# Patient Record
Sex: FEMALE | Race: WHITE | NOT HISPANIC OR LATINO | Employment: PART TIME | URBAN - METROPOLITAN AREA
[De-identification: names, ages, dates, MRNs, and addresses within clinical notes are randomized per-mention and may not be internally consistent; named-entity substitution may affect disease eponyms.]

---

## 2019-10-29 ENCOUNTER — APPOINTMENT (EMERGENCY)
Dept: CT IMAGING | Facility: HOSPITAL | Age: 64
DRG: 816 | End: 2019-10-29
Payer: COMMERCIAL

## 2019-10-29 ENCOUNTER — HOSPITAL ENCOUNTER (INPATIENT)
Facility: HOSPITAL | Age: 64
LOS: 4 days | Discharge: HOME | DRG: 816 | End: 2019-11-02
Attending: EMERGENCY MEDICINE | Admitting: INTERNAL MEDICINE
Payer: COMMERCIAL

## 2019-10-29 DIAGNOSIS — T51.2X1A ISOPROPYL ALCOHOL POISONING: ICD-10-CM

## 2019-10-29 DIAGNOSIS — G93.40 ACUTE ENCEPHALOPATHY: Primary | ICD-10-CM

## 2019-10-29 DIAGNOSIS — R65.10 SIRS (SYSTEMIC INFLAMMATORY RESPONSE SYNDROME) (HCC): ICD-10-CM

## 2019-10-29 DIAGNOSIS — N17.9 AKI (ACUTE KIDNEY INJURY) (HCC): ICD-10-CM

## 2019-10-29 DIAGNOSIS — F32.A DEPRESSION: ICD-10-CM

## 2019-10-29 DIAGNOSIS — K29.21 ACUTE ALCOHOLIC GASTRITIS WITH HEMORRHAGE: ICD-10-CM

## 2019-10-29 PROBLEM — G92.8 TOXIC METABOLIC ENCEPHALOPATHY: Status: ACTIVE | Noted: 2019-10-29

## 2019-10-29 PROBLEM — F10.20 CHRONIC ALCOHOLISM (HCC): Status: ACTIVE | Noted: 2019-10-29

## 2019-10-29 PROBLEM — E87.6 HYPOKALEMIA: Status: ACTIVE | Noted: 2019-10-29

## 2019-10-29 PROBLEM — D72.829 LEUKOCYTOSIS: Status: ACTIVE | Noted: 2019-10-29

## 2019-10-29 LAB
ABO GROUP BLD: NORMAL
ALBUMIN SERPL BCP-MCNC: 3.6 G/DL (ref 3.5–5)
ALP SERPL-CCNC: 87 U/L (ref 46–116)
ALT SERPL W P-5'-P-CCNC: 27 U/L (ref 12–78)
AMMONIA PLAS-SCNC: <10 UMOL/L (ref 11–35)
AMPHETAMINES SERPL QL SCN: NEGATIVE
ANION GAP SERPL CALCULATED.3IONS-SCNC: 11 MMOL/L (ref 4–13)
APAP SERPL-MCNC: <2 UG/ML (ref 10–20)
APTT PPP: 26 SECONDS (ref 23–37)
ARTERIAL PATENCY WRIST A: YES
AST SERPL W P-5'-P-CCNC: 45 U/L (ref 5–45)
BACTERIA UR QL AUTO: ABNORMAL /HPF
BARBITURATES UR QL: NEGATIVE
BASE EX.OXY STD BLDV CALC-SCNC: 95.9 % (ref 60–80)
BASE EXCESS BLDA CALC-SCNC: 4.1 MMOL/L
BASE EXCESS BLDV CALC-SCNC: 1.5 MMOL/L
BASOPHILS # BLD AUTO: 0.03 THOUSANDS/ΜL (ref 0–0.1)
BASOPHILS NFR BLD AUTO: 0 % (ref 0–1)
BENZODIAZ UR QL: NEGATIVE
BILIRUB SERPL-MCNC: 0.35 MG/DL (ref 0.2–1)
BILIRUB UR QL STRIP: NEGATIVE
BLD GP AB SCN SERPL QL: NEGATIVE
BUN SERPL-MCNC: 36 MG/DL (ref 5–25)
CALCIUM SERPL-MCNC: 8.3 MG/DL (ref 8.3–10.1)
CHLORIDE SERPL-SCNC: 94 MMOL/L (ref 100–108)
CLARITY UR: CLEAR
CO2 SERPL-SCNC: 28 MMOL/L (ref 21–32)
COCAINE UR QL: NEGATIVE
COLOR UR: YELLOW
CREAT SERPL-MCNC: 2.54 MG/DL (ref 0.6–1.3)
EOSINOPHIL # BLD AUTO: 0.01 THOUSAND/ΜL (ref 0–0.61)
EOSINOPHIL NFR BLD AUTO: 0 % (ref 0–6)
ERYTHROCYTE [DISTWIDTH] IN BLOOD BY AUTOMATED COUNT: 13.4 % (ref 11.6–15.1)
ETHANOL SERPL-MCNC: <3 MG/DL (ref 0–3)
GFR SERPL CREATININE-BSD FRML MDRD: 20 ML/MIN/1.73SQ M
GLUCOSE SERPL-MCNC: 177 MG/DL (ref 65–140)
GLUCOSE UR STRIP-MCNC: NEGATIVE MG/DL
HCO3 BLDA-SCNC: 24.4 MMOL/L (ref 22–28)
HCO3 BLDV-SCNC: 20.9 MMOL/L (ref 24–30)
HCT VFR BLD AUTO: 37.8 % (ref 34.8–46.1)
HGB BLD-MCNC: 12.8 G/DL (ref 11.5–15.4)
HGB UR QL STRIP.AUTO: ABNORMAL
IMM GRANULOCYTES # BLD AUTO: 0.21 THOUSAND/UL (ref 0–0.2)
IMM GRANULOCYTES NFR BLD AUTO: 1 % (ref 0–2)
INR PPP: 1.08 (ref 0.84–1.19)
KETONES UR STRIP-MCNC: ABNORMAL MG/DL
LACTATE SERPL-SCNC: 2.7 MMOL/L (ref 0.5–2)
LEUKOCYTE ESTERASE UR QL STRIP: NEGATIVE
LYMPHOCYTES # BLD AUTO: 2.18 THOUSANDS/ΜL (ref 0.6–4.47)
LYMPHOCYTES NFR BLD AUTO: 10 % (ref 14–44)
MCH RBC QN AUTO: 30.4 PG (ref 26.8–34.3)
MCHC RBC AUTO-ENTMCNC: 33.9 G/DL (ref 31.4–37.4)
MCV RBC AUTO: 90 FL (ref 82–98)
METHADONE UR QL: NEGATIVE
MONOCYTES # BLD AUTO: 1.17 THOUSAND/ΜL (ref 0.17–1.22)
MONOCYTES NFR BLD AUTO: 6 % (ref 4–12)
NEUTROPHILS # BLD AUTO: 17.47 THOUSANDS/ΜL (ref 1.85–7.62)
NEUTS SEG NFR BLD AUTO: 83 % (ref 43–75)
NITRITE UR QL STRIP: NEGATIVE
NON VENT ROOM AIR: 21 %
NON-SQ EPI CELLS URNS QL MICRO: ABNORMAL /HPF
NRBC BLD AUTO-RTO: 0 /100 WBCS
O2 CT BLDA-SCNC: 16.8 ML/DL (ref 16–23)
O2 CT BLDV-SCNC: 18.5 ML/DL
OPIATES UR QL SCN: NEGATIVE
OXYHGB MFR BLDA: 94.1 % (ref 94–97)
PCO2 BLDA: 25 MM HG (ref 36–44)
PCO2 BLDV: 21 MM HG (ref 42–50)
PCP UR QL: NEGATIVE
PH BLDA: 7.61 [PH] (ref 7.35–7.45)
PH BLDV: 7.62 [PH] (ref 7.3–7.4)
PH UR STRIP.AUTO: 7 [PH] (ref 4.5–8)
PLATELET # BLD AUTO: 315 THOUSANDS/UL (ref 149–390)
PMV BLD AUTO: 10.2 FL (ref 8.9–12.7)
PO2 BLDA: 66.3 MM HG (ref 75–129)
PO2 BLDV: 108.2 MM HG (ref 35–45)
POTASSIUM SERPL-SCNC: 3.2 MMOL/L (ref 3.5–5.3)
PROT SERPL-MCNC: 6.7 G/DL (ref 6.4–8.2)
PROT UR STRIP-MCNC: ABNORMAL MG/DL
PROTHROMBIN TIME: 14.1 SECONDS (ref 11.6–14.5)
RBC # BLD AUTO: 4.21 MILLION/UL (ref 3.81–5.12)
RBC #/AREA URNS AUTO: ABNORMAL /HPF
RH BLD: NEGATIVE
SALICYLATES SERPL-MCNC: <3 MG/DL (ref 3–20)
SODIUM SERPL-SCNC: 133 MMOL/L (ref 136–145)
SP GR UR STRIP.AUTO: 1.01 (ref 1–1.03)
SPECIMEN EXPIRATION DATE: NORMAL
SPECIMEN SOURCE: ABNORMAL
THC UR QL: NEGATIVE
TSH SERPL DL<=0.05 MIU/L-ACNC: 0.39 UIU/ML (ref 0.36–3.74)
UROBILINOGEN UR QL STRIP.AUTO: 0.2 E.U./DL
WBC # BLD AUTO: 21.07 THOUSAND/UL (ref 4.31–10.16)
WBC #/AREA URNS AUTO: ABNORMAL /HPF

## 2019-10-29 PROCEDURE — 86901 BLOOD TYPING SEROLOGIC RH(D): CPT | Performed by: EMERGENCY MEDICINE

## 2019-10-29 PROCEDURE — 85025 COMPLETE CBC W/AUTO DIFF WBC: CPT | Performed by: EMERGENCY MEDICINE

## 2019-10-29 PROCEDURE — 83605 ASSAY OF LACTIC ACID: CPT | Performed by: EMERGENCY MEDICINE

## 2019-10-29 PROCEDURE — 71250 CT THORAX DX C-: CPT

## 2019-10-29 PROCEDURE — 84443 ASSAY THYROID STIM HORMONE: CPT | Performed by: EMERGENCY MEDICINE

## 2019-10-29 PROCEDURE — 82805 BLOOD GASES W/O2 SATURATION: CPT | Performed by: NURSE PRACTITIONER

## 2019-10-29 PROCEDURE — 99285 EMERGENCY DEPT VISIT HI MDM: CPT

## 2019-10-29 PROCEDURE — 99285 EMERGENCY DEPT VISIT HI MDM: CPT | Performed by: EMERGENCY MEDICINE

## 2019-10-29 PROCEDURE — 85730 THROMBOPLASTIN TIME PARTIAL: CPT | Performed by: EMERGENCY MEDICINE

## 2019-10-29 PROCEDURE — 80307 DRUG TEST PRSMV CHEM ANLYZR: CPT | Performed by: EMERGENCY MEDICINE

## 2019-10-29 PROCEDURE — 93005 ELECTROCARDIOGRAM TRACING: CPT

## 2019-10-29 PROCEDURE — 74176 CT ABD & PELVIS W/O CONTRAST: CPT

## 2019-10-29 PROCEDURE — 82805 BLOOD GASES W/O2 SATURATION: CPT | Performed by: EMERGENCY MEDICINE

## 2019-10-29 PROCEDURE — C9113 INJ PANTOPRAZOLE SODIUM, VIA: HCPCS | Performed by: EMERGENCY MEDICINE

## 2019-10-29 PROCEDURE — 96361 HYDRATE IV INFUSION ADD-ON: CPT

## 2019-10-29 PROCEDURE — 36415 COLL VENOUS BLD VENIPUNCTURE: CPT | Performed by: EMERGENCY MEDICINE

## 2019-10-29 PROCEDURE — 81001 URINALYSIS AUTO W/SCOPE: CPT

## 2019-10-29 PROCEDURE — 96365 THER/PROPH/DIAG IV INF INIT: CPT

## 2019-10-29 PROCEDURE — 86900 BLOOD TYPING SEROLOGIC ABO: CPT | Performed by: EMERGENCY MEDICINE

## 2019-10-29 PROCEDURE — 82140 ASSAY OF AMMONIA: CPT | Performed by: EMERGENCY MEDICINE

## 2019-10-29 PROCEDURE — 86850 RBC ANTIBODY SCREEN: CPT | Performed by: EMERGENCY MEDICINE

## 2019-10-29 PROCEDURE — 70450 CT HEAD/BRAIN W/O DYE: CPT

## 2019-10-29 PROCEDURE — 80053 COMPREHEN METABOLIC PANEL: CPT | Performed by: EMERGENCY MEDICINE

## 2019-10-29 PROCEDURE — 96367 TX/PROPH/DG ADDL SEQ IV INF: CPT

## 2019-10-29 PROCEDURE — 80320 DRUG SCREEN QUANTALCOHOLS: CPT | Performed by: EMERGENCY MEDICINE

## 2019-10-29 PROCEDURE — 85610 PROTHROMBIN TIME: CPT | Performed by: EMERGENCY MEDICINE

## 2019-10-29 PROCEDURE — 80329 ANALGESICS NON-OPIOID 1 OR 2: CPT | Performed by: EMERGENCY MEDICINE

## 2019-10-29 RX ORDER — DOXYCYCLINE HYCLATE 100 MG/1
100 CAPSULE ORAL EVERY 12 HOURS SCHEDULED
COMMUNITY
End: 2019-11-02 | Stop reason: HOSPADM

## 2019-10-29 RX ORDER — LORAZEPAM 2 MG/ML
1 INJECTION INTRAMUSCULAR EVERY 4 HOURS PRN
Status: DISCONTINUED | OUTPATIENT
Start: 2019-10-29 | End: 2019-10-31

## 2019-10-29 RX ORDER — SODIUM CHLORIDE, SODIUM LACTATE, POTASSIUM CHLORIDE, CALCIUM CHLORIDE 600; 310; 30; 20 MG/100ML; MG/100ML; MG/100ML; MG/100ML
125 INJECTION, SOLUTION INTRAVENOUS CONTINUOUS
Status: DISCONTINUED | OUTPATIENT
Start: 2019-10-29 | End: 2019-10-30

## 2019-10-29 RX ADMIN — SODIUM CHLORIDE, SODIUM LACTATE, POTASSIUM CHLORIDE, AND CALCIUM CHLORIDE 125 ML/HR: .6; .31; .03; .02 INJECTION, SOLUTION INTRAVENOUS at 21:37

## 2019-10-29 RX ADMIN — SODIUM CHLORIDE 80 MG: 9 INJECTION, SOLUTION INTRAVENOUS at 20:03

## 2019-10-29 RX ADMIN — SODIUM CHLORIDE 1000 ML: 0.9 INJECTION, SOLUTION INTRAVENOUS at 19:25

## 2019-10-29 RX ADMIN — SODIUM CHLORIDE, SODIUM LACTATE, POTASSIUM CHLORIDE, AND CALCIUM CHLORIDE 1000 ML: .6; .31; .03; .02 INJECTION, SOLUTION INTRAVENOUS at 21:36

## 2019-10-29 NOTE — ED NOTES
RN called pharmacy at this time, Protonix is being mixed at this time        Vicki Murphy RN  10/29/19 1945

## 2019-10-29 NOTE — ED ATTENDING ATTESTATION
10/29/2019  IMary DO, saw and evaluated the patient  I have discussed the patient with the resident/non-physician practitioner and agree with the resident's/non-physician practitioner's findings, Plan of Care, and MDM as documented in the resident's/non-physician practitioner's note, except where noted  All available labs and Radiology studies were reviewed  I was present for key portions of any procedure(s) performed by the resident/non-physician practitioner and I was immediately available to provide assistance  At this point I agree with the current assessment done in the Emergency Department  I have conducted an independent evaluation of this patient a history and physical is as follows:    72-year-old female presents via EMS for evaluation of altered mental status with bloody vomitus  The patient is nonverbal and history is provided by EMS  The patient was unable to provide any history or review of systems  The patient apparently did not report to work for the past week and coworkers went up to check on the patient  They found her home with an altered mental status and vomitus all over her residence  There coffee cancer filled with dark brown vomitus  The patient was covered in bloody and dark vomitus  The patient's eyes are open however she does not respond to questions or make any significant purposeful movements  On initial examination:  The patient is awake with her eyes open but not responding to verbal questioning or commands  Pupils are equal round right reactive to light bilaterally    The sclera nonicteric  The head is normocephalic and atraumatic  Mucous membranes are dry with vomitus noted  The external ears are normal bilaterally  The neck is supple without JVD or tracheal deviation  The lungs are clear to auscultation bilaterally without increased work of breathing X line the heart is regular and tachycardic there is no murmur appreciated  The abdomen is soft, protuberant, nontender nondistended with positive bowel sounds  There is no rebound or guarding  The external genitalia are grossly normal  Skin is warm dry with scratches noted  There is no specific rash  Patient is awake, nonverbal, the eyes open spontaneously, patient withdraws to painful stimuli and has purposeful movement    Plan is to obtain a CT of the head abdomen pelvis for occult trauma, infection or perforation  Laboratories will be sent to check for anemia, toxidrome, metabolic derangement and other etiologies of the patient's acute encephalopathy    ED Course  ED Course as of Oct 31 1836   Tue Oct 29, 2019   2114 Patient improving upon re-evaluation  The patient still is not verbal however she is looking and tracking following me around the room              Critical Care Time 40 minutes for cephalopathy with GI bleed  Procedures

## 2019-10-29 NOTE — ED PROVIDER NOTES
History  Chief Complaint   Patient presents with    Altered Mental Status     Per EMS pt was not at work x1 week  Coworkers went to pts house to do well fair check on pt and found covered in brown vomitus  Pt is not speaking and is looking blankly around  Patient presents for evaluation of altered mental status  Patient is not interactive, no verbal responses  History is provided via EMS and through patient's patent family who showed up later  States that the patient has not been seen at work for approximately 1 week and the family was notified  They went to check on her and found her at her home of residence covered in a dark vomit with a bottle of wine and port and the near her person  They state that she does have a history of alcoholism and has done something like this before  They also believe that she has history of gastric varices which she was previously hospitalized for  Record review show history of arthritis and hypothyroidism  Prior to Admission Medications   Prescriptions Last Dose Informant Patient Reported? Taking? adalimumab (HUMIRA) 20 mg/0 4 mL PSKT injection  Family Member Yes Yes   Sig: Inject 20 mg under the skin once   doxycycline hyclate (VIBRAMYCIN) 100 mg capsule  Family Member Yes Yes   Sig: Take 100 mg by mouth every 12 (twelve) hours      Facility-Administered Medications: None       Past Medical History:   Diagnosis Date    Arthritis     Disease of thyroid gland     Esophageal ulceration        History reviewed  No pertinent surgical history  History reviewed  No pertinent family history  I have reviewed and agree with the history as documented  Social History     Tobacco Use    Smoking status: Unknown If Ever Smoked   Substance Use Topics    Alcohol use:  Yes    Drug use: Not on file        Review of Systems   Unable to perform ROS: Mental status change       Physical Exam  ED Triage Vitals [10/29/19 1917]   Temperature Pulse Respirations Blood Pressure SpO2   99 4 °F (37 4 °C) (!) 136 16 113/55 97 %      Temp Source Heart Rate Source Patient Position - Orthostatic VS BP Location FiO2 (%)   Rectal Monitor Lying Right arm --      Pain Score       No Pain             Orthostatic Vital Signs  Vitals:    10/30/19 0035 10/30/19 0049 10/30/19 0050 10/30/19 0100   BP: 112/66   (!) 109/47   Pulse: (!) 131 (!) 128 (!) 118 (!) 122   Patient Position - Orthostatic VS: Lying          Physical Exam   Constitutional: She appears well-nourished  No distress  HENT:   Head: Normocephalic and atraumatic  Right Ear: External ear normal    Left Ear: External ear normal    Mouth/Throat: Oropharynx is clear and moist    Eyes: Pupils are equal, round, and reactive to light  Conjunctivae and EOM are normal  Right eye exhibits no discharge  Left eye exhibits no discharge  No scleral icterus  Neck: Normal range of motion  Neck supple  No JVD present  Cardiovascular: Normal rate, regular rhythm, normal heart sounds and intact distal pulses  Exam reveals no gallop and no friction rub  No murmur heard  Pulmonary/Chest: Effort normal and breath sounds normal  No respiratory distress  She has no wheezes  She has no rales  Abdominal: Soft  Bowel sounds are normal  She exhibits no distension and no mass  There is no tenderness  There is no guarding  Musculoskeletal: Normal range of motion  She exhibits no tenderness or deformity  Neurological: No cranial nerve deficit or sensory deficit  She exhibits normal muscle tone  Coordination normal  GCS eye subscore is 4  GCS verbal subscore is 1  GCS motor subscore is 5  Patient appears to track movement while in the room, and buttocks when she is dressed  Does not follow any commands, no verbal responses  Does move all extremities independently  Skin: Skin is warm  She is not diaphoretic  Three linear, small scratches on medial portion of right thigh  EMS reports there were multiple cats at the home     Psychiatric: Judgment and thought content normal    Vitals reviewed  ED Medications  Medications   lactated ringers infusion (0 mL/hr Intravenous Stopped 10/30/19 0024)   chlorhexidine (PERIDEX) 0 12 % oral rinse 15 mL (15 mL Swish & Spit Not Given 10/30/19 0054)   lactated ringers infusion (150 mL/hr Intravenous New Bag 10/30/19 0129)   heparin (porcine) subcutaneous injection 5,000 Units (5,000 Units Subcutaneous Given 10/30/19 0138)   thiamine (VITAMIN B1) 100 mg in sodium chloride 0 9 % 50 mL IVPB (has no administration in time range)   cefTRIAXone (ROCEPHIN) 1,000 mg in dextrose 5 % 50 mL IVPB (1,000 mg Intravenous New Bag 10/30/19 0127)   pantoprazole (PROTONIX) injection 40 mg (40 mg Intravenous Not Given 10/30/19 0130)   LORazepam (ATIVAN) 2 mg/mL injection 1 mg (has no administration in time range)   sodium chloride 0 9 % bolus 1,000 mL (0 mL Intravenous Stopped 10/29/19 2023)   pantoprazole (PROTONIX) 80 mg in sodium chloride 0 9 % 100 mL IVPB (0 mg Intravenous Stopped 10/29/19 2037)   lactated ringers bolus 1,000 mL (0 mL Intravenous Stopped 10/30/19 0024)       Diagnostic Studies  Results Reviewed     Procedure Component Value Units Date/Time    POCT urinalysis dipstick [[id number]]  (Abnormal) Resulted:  10/29/19 2158    Lab Status:  Final result Specimen:  Urine Updated:  10/29/19 2305    Rapid drug screen, urine [[id number]]  (Normal) Collected:  10/29/19 2155    Lab Status:  Final result Specimen:  Urine, Clean Catch Updated:  10/29/19 2216     Amph/Meth UR Negative     Barbiturate Ur Negative     Benzodiazepine Urine Negative     Cocaine Urine Negative     Methadone Urine Negative     Opiate Urine Negative     PCP Ur Negative     THC Urine Negative    Narrative:       FOR MEDICAL PURPOSES ONLY  IF CONFIRMATION NEEDED PLEASE CONTACT THE LAB WITHIN 5 DAYS      Drug Screen Cutoff Levels:  AMPHETAMINE/METHAMPHETAMINES  1000 ng/mL  BARBITURATES     200 ng/mL  BENZODIAZEPINES     200 ng/mL  COCAINE      300 ng/mL  METHADONE      300 ng/mL  OPIATES      300 ng/mL  PHENCYCLIDINE     25 ng/mL  THC       50 ng/mL      Urine Microscopic [[id number]]  (Abnormal) Collected:  10/29/19 2157    Lab Status:  Final result Specimen:  Urine, Clean Catch Updated:  10/29/19 2216     RBC, UA 0-1 /hpf      WBC, UA 1-2 /hpf      Epithelial Cells Occasional /hpf      Bacteria, UA Occasional /hpf     ED Urine Macroscopic [[id number]]  (Abnormal) Collected:  10/29/19 2157    Lab Status:  Final result Specimen:  Urine Updated:  10/29/19 2158     Color, UA Yellow     Clarity, UA Clear     pH, UA 7 0     Leukocytes, UA Negative     Nitrite, UA Negative     Protein, UA Trace mg/dl      Glucose, UA Negative mg/dl      Ketones, UA 80 (3+) mg/dl      Urobilinogen, UA 0 2 E U /dl      Bilirubin, UA Negative     Blood, UA Trace     Specific [place], UA 1 015    Narrative:       CLINITEK RESULT    Blood gas, arterial [[id number]]  (Abnormal) Collected:  10/29/19 2114    Lab Status:  Final result Specimen:  Blood, Arterial from Radial, Right Updated:  10/29/19 2120     pH, Arterial 7 608     pCO2, Arterial 25 0 mm Hg      pO2, Arterial 66 3 mm Hg      HCO3, Arterial 24 4 mmol/L      Base Excess, Arterial 4 1 mmol/L      O2 Content, Arterial 16 8 mL/dL      O2 HGB,Arterial  94 1 %      SOURCE Radial, Right     REBEKA TEST Yes     ROOM AIR FIO2 21 %     TSH [[id number]]  (Normal) Collected:  10/29/19 1926    Lab Status:  Final result Specimen:  Blood from Arm, Right Updated:  10/29/19 2021     TSH 3RD GENERATON 0 387 uIU/mL     Narrative:       Patients undergoing fluorescein dye angiography may retain small amounts of fluorescein in the body for 48-72 hours post procedure  Samples containing fluorescein can produce falsely depressed TSH values  If the patient had this procedure,a specimen should be resubmitted post fluorescein clearance  Acetaminophen level-If concentration is detectable, please discuss with medical  on call   [[id number]] (Abnormal) Collected:  10/29/19 1926    Lab Status:  Final result Specimen:  Blood from Arm, Right Updated:  10/29/19 2020     Acetaminophen Level <2 ug/mL     Salicylate level [[id number]]  (Abnormal) Collected:  10/29/19 1926    Lab Status:  Final result Specimen:  Blood from Arm, Right Updated:  18/23/77 9730     Salicylate Lvl <3 mg/dL     Lactic acid, plasma [[id number]]  (Abnormal) Collected:  10/29/19 1926    Lab Status:  Final result Specimen:  Blood from Arm, Right Updated:  10/29/19 2015     LACTIC ACID 2 7 mmol/L     Narrative:       Result may be elevated if tourniquet was used during collection      Ammonia [[id number]]  (Abnormal) Collected:  10/29/19 1926    Lab Status:  Final result Specimen:  Blood from Arm, Right Updated:  10/29/19 2007     Ammonia <10 umol/L     Comprehensive metabolic panel [[id number]]  (Abnormal) Collected:  10/29/19 1926    Lab Status:  Final result Specimen:  Blood from Arm, Right Updated:  10/29/19 1954     Sodium 133 mmol/L      Potassium 3 2 mmol/L      Chloride 94 mmol/L      CO2 28 mmol/L      ANION GAP 11 mmol/L      BUN 36 mg/dL      Creatinine 2 54 mg/dL      Glucose 177 mg/dL      Calcium 8 3 mg/dL      AST 45 U/L      ALT 27 U/L      Alkaline Phosphatase 87 U/L      Total Protein 6 7 g/dL      Albumin 3 6 g/dL      Total Bilirubin 0 35 mg/dL      eGFR 20 ml/min/1 73sq m     Narrative:       [place] guidelines for Chronic Kidney Disease (CKD):     Stage 1 with normal or high GFR (GFR > 90 mL/min/1 73 square meters)    Stage 2 Mild CKD (GFR = 60-89 mL/min/1 73 square meters)    Stage 3A Moderate CKD (GFR = 45-59 mL/min/1 73 square meters)    Stage 3B Moderate CKD (GFR = 30-44 mL/min/1 73 square meters)    Stage 4 Severe CKD (GFR = 15-29 mL/min/1 73 square meters)    Stage 5 End Stage CKD (GFR <15 mL/min/1 73 square meters)  Note: GFR calculation is accurate only with a steady state creatinine    Ethanol [[id number]]  (Normal) Collected: 10/29/19 1926    Lab Status:  Final result Specimen:  Blood from Arm, Right Updated:  10/29/19 1952     Ethanol Lvl <3 mg/dL     Protime-INR [[id number]]  (Normal) Collected:  10/29/19 1926    Lab Status:  Final result Specimen:  Blood from Arm, Right Updated:  10/29/19 1946     Protime 14 1 seconds      INR 1 08    APTT [[id number]]  (Normal) Collected:  10/29/19 1926    Lab Status:  Final result Specimen:  Blood from Arm, Right Updated:  10/29/19 1946     PTT 26 seconds     CBC and differential [[id number]]  (Abnormal) Collected:  10/29/19 1926    Lab Status:  Final result Specimen:  Blood from Arm, Right Updated:  10/29/19 1940     WBC 21 07 Thousand/uL      RBC 4 21 Million/uL      Hemoglobin 12 8 g/dL      Hematocrit 37 8 %      MCV 90 fL      MCH 30 4 pg      MCHC 33 9 g/dL      RDW 13 4 %      MPV 10 2 fL      Platelets 024 Thousands/uL      nRBC 0 /100 WBCs      Neutrophils Relative 83 %      Immat GRANS % 1 %      Lymphocytes Relative 10 %      Monocytes Relative 6 %      Eosinophils Relative 0 %      Basophils Relative 0 %      Neutrophils Absolute 17 47 Thousands/µL      Immature Grans Absolute 0 21 Thousand/uL      Lymphocytes Absolute 2 18 Thousands/µL      Monocytes Absolute 1 17 Thousand/µL      Eosinophils Absolute 0 01 Thousand/µL      Basophils Absolute 0 03 Thousands/µL     Blood gas, venous [[id number]]  (Abnormal) Collected:  10/29/19 1926    Lab Status:  Final result Specimen:  Blood from Arm, Right Updated:  10/29/19 1937     pH, Abhishek 7 615     pCO2, Abhishek 21 0 mm Hg      pO2, Abhishek 108 2 mm Hg      HCO3, Abhishek 20 9 mmol/L      Base Excess, Abhishek 1 5 mmol/L      O2 Content, Abhishek 18 5 ml/dL      O2 HGB, VENOUS 95 9 %                  CT head without contrast   Final Result by Kraig León MD (10/29 2100)      No acute intracranial abnormality  Workstation performed: [id number]         CT chest abdomen pelvis wo contrast   Final Result by Fili Randolph MD (10/29 2129)      1  Moderate hiatal hernia and diffuse thickening of the esophagus with intraluminal fluid compatible with reflux esophagitis  Correlate with endoscopy  2   Loop of bowel with intraluminal crescentic fat (series 2, image 73) which may represent a transient small bowel intussusception  There is no bowel obstruction  Workstation performed: [id number]               Procedures  Procedures        ED Course  ED Course as of Oct 30 0203   Tue Oct 29, 2019   1944 Pulse(!): 136   1948 WBC(!): 21 07   1954 MEDICAL ALCOHOL: <3   1955 Creatinine(!): 2 54   2017 LACTIC ACID(!!): 2 7                               [facility]  Number of Diagnoses or Management Options  Acute encephalopathy:   [facility]) ([facility]   SIRS (systemic inflammatory response syndrome) ([facility] ):   Diagnosis management comments: Based on patient's history a broad workup was started which showed an elevated white count at over 21, respiratory alkalosis at a pH of 7 608, CO2 of 25  Come panel was negative, ammonia normal, CT of head, chest, abdomen, pelvis showed known hiatal hernia as well as some mild intussusception  A KI of 2 54  No obvious underlying cause for change in mental state  Patient was admitted to the ICU, step-down 1 for further monitoring and observation          Disposition  Final diagnoses:   MILAGRO (acute kidney injury) ([facility] )   Acute encephalopathy   SIRS (systemic inflammatory response syndrome) ([facility] )     Time reflects when diagnosis was documented in both MDM as applicable and the Disposition within this note     Time User Action Codes Description Comment    10/29/2019  9:16 PM Philip Dame Add [G93 40] Encephalopathy     10/29/2019  9:16 PM Philip Dame Add [N17 9] MILAGRO (acute kidney injury) ([facility] )     10/29/2019  9:16 PM Ivan Carly B Modify [N17 9] MILAGRO (acute kidney injury) ([facility] )     10/29/2019  9:16 PM Ivan Carly B Remove [G93 40] Encephalopathy     10/29/2019  9:16 PM Ivan Carly B Add [G93 40] Acute encephalopathy     10/29/2019  9:16 PM Dallas Loomis B Modify [N17 9] MILAGRO (acute kidney injury) ([facility] )     10/29/2019  9:16 PM Dallas Loomis B Modify [G93 40] Acute encephalopathy     10/29/2019  9:16 PM Dallas Loomis B Add [R65 10] SIRS (systemic inflammatory response syndrome) [facility])       ED Disposition     ED Disposition Condition Date/Time Comment    Admit Stable Tue Oct 29, 2019  9:54 PM Case was discussed with ITALIA and the patient's admission status was agreed to be Admission Status: inpatient status to the service of Dr Jl Reilly  Follow-up Information    None         Current Discharge Medication List      CONTINUE these medications which have NOT CHANGED    Details   adalimumab (HUMIRA) 20 mg/0 4 mL PSKT injection Inject 20 mg under the skin once      doxycycline hyclate (VIBRAMYCIN) 100 mg capsule Take 100 mg by mouth every 12 (twelve) hours           No discharge procedures on file  ED Provider  Attending physically available and evaluated Yana Davis I managed the patient along with the ED Attending      Electronically Signed by         Laurita Homans, MD  10/30/19 9363

## 2019-10-30 LAB
ALBUMIN SERPL BCP-MCNC: 3 G/DL (ref 3.5–5)
ALP SERPL-CCNC: 69 U/L (ref 46–116)
ALT SERPL W P-5'-P-CCNC: 29 U/L (ref 12–78)
ANION GAP BLD CALC-SCNC: 16 MMOL/L (ref 4–13)
ANION GAP SERPL CALCULATED.3IONS-SCNC: 9 MMOL/L (ref 4–13)
ARTERIAL PATENCY WRIST A: YES
AST SERPL W P-5'-P-CCNC: 53 U/L (ref 5–45)
ATRIAL RATE: 133 BPM
ATRIAL RATE: 138 BPM
BASE EXCESS BLDA CALC-SCNC: 4.8 MMOL/L
BASOPHILS # BLD AUTO: 0.02 THOUSANDS/ΜL (ref 0–0.1)
BASOPHILS NFR BLD AUTO: 0 % (ref 0–1)
BILIRUB SERPL-MCNC: 0.32 MG/DL (ref 0.2–1)
BUN BLD-MCNC: 16 MG/DL (ref 5–25)
BUN SERPL-MCNC: 25 MG/DL (ref 5–25)
CA-I BLD-SCNC: 0.97 MMOL/L (ref 1.12–1.32)
CA-I BLD-SCNC: 0.97 MMOL/L (ref 1.12–1.32)
CALCIUM SERPL-MCNC: 7.6 MG/DL (ref 8.3–10.1)
CHLORIDE BLD-SCNC: 103 MMOL/L (ref 100–108)
CHLORIDE SERPL-SCNC: 102 MMOL/L (ref 100–108)
CO2 SERPL-SCNC: 26 MMOL/L (ref 21–32)
CREAT BLD-MCNC: 0.6 MG/DL (ref 0.6–1.3)
CREAT SERPL-MCNC: 2.41 MG/DL (ref 0.6–1.3)
EOSINOPHIL # BLD AUTO: 0 THOUSAND/ΜL (ref 0–0.61)
EOSINOPHIL NFR BLD AUTO: 0 % (ref 0–6)
ERYTHROCYTE [DISTWIDTH] IN BLOOD BY AUTOMATED COUNT: 13.7 % (ref 11.6–15.1)
GFR SERPL CREATININE-BSD FRML MDRD: 21 ML/MIN/1.73SQ M
GFR SERPL CREATININE-BSD FRML MDRD: 98 ML/MIN/1.73SQ M
GLUCOSE SERPL-MCNC: 137 MG/DL (ref 65–140)
GLUCOSE SERPL-MCNC: 146 MG/DL (ref 65–140)
HCO3 BLDA-SCNC: 27.7 MMOL/L (ref 22–28)
HCT VFR BLD AUTO: 31.2 % (ref 34.8–46.1)
HCT VFR BLD CALC: 28 % (ref 34.8–46.1)
HEMOCCULT STL QL: POSITIVE
HGB BLD-MCNC: 10.6 G/DL (ref 11.5–15.4)
HGB BLDA-MCNC: 9.5 G/DL (ref 11.5–15.4)
IMM GRANULOCYTES # BLD AUTO: 0.17 THOUSAND/UL (ref 0–0.2)
IMM GRANULOCYTES NFR BLD AUTO: 1 % (ref 0–2)
LACTATE SERPL-SCNC: 2.2 MMOL/L (ref 0.5–2)
LYMPHOCYTES # BLD AUTO: 2.84 THOUSANDS/ΜL (ref 0.6–4.47)
LYMPHOCYTES NFR BLD AUTO: 16 % (ref 14–44)
MAGNESIUM SERPL-MCNC: 2 MG/DL (ref 1.6–2.6)
MCH RBC QN AUTO: 31 PG (ref 26.8–34.3)
MCHC RBC AUTO-ENTMCNC: 34 G/DL (ref 31.4–37.4)
MCV RBC AUTO: 91 FL (ref 82–98)
MONOCYTES # BLD AUTO: 1.33 THOUSAND/ΜL (ref 0.17–1.22)
MONOCYTES NFR BLD AUTO: 7 % (ref 4–12)
NEUTROPHILS # BLD AUTO: 13.77 THOUSANDS/ΜL (ref 1.85–7.62)
NEUTS SEG NFR BLD AUTO: 76 % (ref 43–75)
NON VENT ROOM AIR: 0.21 %
NRBC BLD AUTO-RTO: 0 /100 WBCS
O2 CT BLDA-SCNC: 14.2 ML/DL (ref 16–23)
OXYHGB MFR BLDA: 92.5 % (ref 94–97)
P AXIS: 57 DEGREES
P AXIS: 59 DEGREES
PCO2 BLD: 27 MMOL/L (ref 21–32)
PCO2 BLDA: 34.9 MM HG (ref 36–44)
PH BLDA: 7.52 [PH] (ref 7.35–7.45)
PLATELET # BLD AUTO: 232 THOUSANDS/UL (ref 149–390)
PMV BLD AUTO: 10.5 FL (ref 8.9–12.7)
PO2 BLDA: 64.4 MM HG (ref 75–129)
POTASSIUM BLD-SCNC: 3.4 MMOL/L (ref 3.5–5.3)
POTASSIUM SERPL-SCNC: 3 MMOL/L (ref 3.5–5.3)
PR INTERVAL: 116 MS
PR INTERVAL: 120 MS
PROT SERPL-MCNC: 6 G/DL (ref 6.4–8.2)
QRS AXIS: 37 DEGREES
QRS AXIS: 39 DEGREES
QRSD INTERVAL: 78 MS
QRSD INTERVAL: 80 MS
QT INTERVAL: 304 MS
QT INTERVAL: 318 MS
QTC INTERVAL: 460 MS
QTC INTERVAL: 473 MS
RBC # BLD AUTO: 3.42 MILLION/UL (ref 3.81–5.12)
SODIUM BLD-SCNC: 141 MMOL/L (ref 136–145)
SODIUM SERPL-SCNC: 137 MMOL/L (ref 136–145)
SPECIMEN SOURCE: ABNORMAL
SPECIMEN SOURCE: ABNORMAL
T WAVE AXIS: 21 DEGREES
T WAVE AXIS: 30 DEGREES
VENTRICULAR RATE: 133 BPM
VENTRICULAR RATE: 138 BPM
WBC # BLD AUTO: 18.13 THOUSAND/UL (ref 4.31–10.16)

## 2019-10-30 PROCEDURE — 82330 ASSAY OF CALCIUM: CPT | Performed by: FAMILY MEDICINE

## 2019-10-30 PROCEDURE — 99222 1ST HOSP IP/OBS MODERATE 55: CPT | Performed by: EMERGENCY MEDICINE

## 2019-10-30 PROCEDURE — 82805 BLOOD GASES W/O2 SATURATION: CPT | Performed by: NURSE PRACTITIONER

## 2019-10-30 PROCEDURE — 99223 1ST HOSP IP/OBS HIGH 75: CPT | Performed by: INTERNAL MEDICINE

## 2019-10-30 PROCEDURE — 85014 HEMATOCRIT: CPT

## 2019-10-30 PROCEDURE — 85025 COMPLETE CBC W/AUTO DIFF WBC: CPT | Performed by: FAMILY MEDICINE

## 2019-10-30 PROCEDURE — 83735 ASSAY OF MAGNESIUM: CPT | Performed by: FAMILY MEDICINE

## 2019-10-30 PROCEDURE — 82272 OCCULT BLD FECES 1-3 TESTS: CPT | Performed by: NURSE PRACTITIONER

## 2019-10-30 PROCEDURE — 80053 COMPREHEN METABOLIC PANEL: CPT | Performed by: FAMILY MEDICINE

## 2019-10-30 PROCEDURE — 36600 WITHDRAWAL OF ARTERIAL BLOOD: CPT

## 2019-10-30 PROCEDURE — C9113 INJ PANTOPRAZOLE SODIUM, VIA: HCPCS | Performed by: NURSE PRACTITIONER

## 2019-10-30 PROCEDURE — 80047 BASIC METABLC PNL IONIZED CA: CPT

## 2019-10-30 PROCEDURE — 93010 ELECTROCARDIOGRAM REPORT: CPT | Performed by: INTERNAL MEDICINE

## 2019-10-30 PROCEDURE — 83605 ASSAY OF LACTIC ACID: CPT | Performed by: NURSE PRACTITIONER

## 2019-10-30 RX ORDER — CHLORHEXIDINE GLUCONATE 0.12 MG/ML
15 RINSE ORAL EVERY 12 HOURS SCHEDULED
Status: DISCONTINUED | OUTPATIENT
Start: 2019-10-30 | End: 2019-10-30

## 2019-10-30 RX ORDER — HEPARIN SODIUM 5000 [USP'U]/ML
5000 INJECTION, SOLUTION INTRAVENOUS; SUBCUTANEOUS EVERY 8 HOURS SCHEDULED
Status: DISCONTINUED | OUTPATIENT
Start: 2019-10-30 | End: 2019-11-02 | Stop reason: HOSPADM

## 2019-10-30 RX ORDER — POTASSIUM CHLORIDE 14.9 MG/ML
20 INJECTION INTRAVENOUS
Status: COMPLETED | OUTPATIENT
Start: 2019-10-30 | End: 2019-10-30

## 2019-10-30 RX ORDER — PANTOPRAZOLE SODIUM 40 MG/1
40 INJECTION, POWDER, FOR SOLUTION INTRAVENOUS EVERY 12 HOURS SCHEDULED
Status: DISCONTINUED | OUTPATIENT
Start: 2019-10-30 | End: 2019-11-01

## 2019-10-30 RX ORDER — METOCLOPRAMIDE HYDROCHLORIDE 5 MG/ML
10 INJECTION INTRAMUSCULAR; INTRAVENOUS EVERY 6 HOURS PRN
Status: DISCONTINUED | OUTPATIENT
Start: 2019-10-30 | End: 2019-11-01

## 2019-10-30 RX ORDER — SODIUM CHLORIDE, SODIUM LACTATE, POTASSIUM CHLORIDE, CALCIUM CHLORIDE 600; 310; 30; 20 MG/100ML; MG/100ML; MG/100ML; MG/100ML
150 INJECTION, SOLUTION INTRAVENOUS CONTINUOUS
Status: DISCONTINUED | OUTPATIENT
Start: 2019-10-30 | End: 2019-10-31

## 2019-10-30 RX ORDER — POTASSIUM CHLORIDE 20 MEQ/1
40 TABLET, EXTENDED RELEASE ORAL ONCE
Status: DISCONTINUED | OUTPATIENT
Start: 2019-10-30 | End: 2019-11-01

## 2019-10-30 RX ADMIN — FOLIC ACID 1 MG: 5 INJECTION, SOLUTION INTRAMUSCULAR; INTRAVENOUS; SUBCUTANEOUS at 12:01

## 2019-10-30 RX ADMIN — ONDANSETRON 8 MG: 2 INJECTION INTRAMUSCULAR; INTRAVENOUS at 05:43

## 2019-10-30 RX ADMIN — HEPARIN SODIUM 5000 UNITS: 5000 INJECTION INTRAVENOUS; SUBCUTANEOUS at 05:43

## 2019-10-30 RX ADMIN — POTASSIUM CHLORIDE 20 MEQ: 14.9 INJECTION, SOLUTION INTRAVENOUS at 11:43

## 2019-10-30 RX ADMIN — PANTOPRAZOLE SODIUM 40 MG: 40 INJECTION, POWDER, FOR SOLUTION INTRAVENOUS at 20:37

## 2019-10-30 RX ADMIN — SODIUM CHLORIDE, SODIUM LACTATE, POTASSIUM CHLORIDE, AND CALCIUM CHLORIDE 150 ML/HR: .6; .31; .03; .02 INJECTION, SOLUTION INTRAVENOUS at 08:13

## 2019-10-30 RX ADMIN — CEFTRIAXONE SODIUM 1000 MG: 10 INJECTION, POWDER, FOR SOLUTION INTRAVENOUS at 01:27

## 2019-10-30 RX ADMIN — CALCIUM GLUCONATE 1 G: 98 INJECTION, SOLUTION INTRAVENOUS at 08:15

## 2019-10-30 RX ADMIN — SODIUM CHLORIDE, SODIUM LACTATE, POTASSIUM CHLORIDE, AND CALCIUM CHLORIDE 150 ML/HR: .6; .31; .03; .02 INJECTION, SOLUTION INTRAVENOUS at 23:09

## 2019-10-30 RX ADMIN — POTASSIUM CHLORIDE 20 MEQ: 14.9 INJECTION, SOLUTION INTRAVENOUS at 08:05

## 2019-10-30 RX ADMIN — HEPARIN SODIUM 5000 UNITS: 5000 INJECTION INTRAVENOUS; SUBCUTANEOUS at 01:38

## 2019-10-30 RX ADMIN — METOCLOPRAMIDE 10 MG: 5 INJECTION, SOLUTION INTRAMUSCULAR; INTRAVENOUS at 14:19

## 2019-10-30 RX ADMIN — PANTOPRAZOLE SODIUM 40 MG: 40 INJECTION, POWDER, FOR SOLUTION INTRAVENOUS at 08:10

## 2019-10-30 RX ADMIN — LORAZEPAM 1 MG: 2 INJECTION INTRAMUSCULAR; INTRAVENOUS at 02:27

## 2019-10-30 RX ADMIN — THIAMINE HYDROCHLORIDE 100 MG: 100 INJECTION, SOLUTION INTRAMUSCULAR; INTRAVENOUS at 12:02

## 2019-10-30 RX ADMIN — HEPARIN SODIUM 5000 UNITS: 5000 INJECTION INTRAVENOUS; SUBCUTANEOUS at 14:12

## 2019-10-30 RX ADMIN — LORAZEPAM 1 MG: 2 INJECTION INTRAMUSCULAR; INTRAVENOUS at 20:49

## 2019-10-30 RX ADMIN — SODIUM CHLORIDE, SODIUM LACTATE, POTASSIUM CHLORIDE, AND CALCIUM CHLORIDE 150 ML/HR: .6; .31; .03; .02 INJECTION, SOLUTION INTRAVENOUS at 01:29

## 2019-10-30 RX ADMIN — HEPARIN SODIUM 5000 UNITS: 5000 INJECTION INTRAVENOUS; SUBCUTANEOUS at 21:28

## 2019-10-30 NOTE — ASSESSMENT & PLAN NOTE
· Unclear source, likely ETOH driven  · Coma panel negative, UDS negative, ammonia level normal, CT head negative  · Trend Neuro exam

## 2019-10-30 NOTE — UTILIZATION REVIEW
Initial Clinical Review    Admission: Date/Time/Statement: Inpatient Admission Orders (From admission, onward)     Ordered        10/29/19 2232  Inpatient Admission  Once         10/29/19 2154  Inpatient Admission (expected length of stay for this patient Order details is greater than two midnights)  Once                   10/29/19 2232  Inpatient Admission Once     Transfer Service: Critical Care/ICU       Question Answer Comment   Admitting Physician ALEXIS PARNELL    Level of Care Level 1 Stepdown    Estimated length of stay More than 2 Midnights    Certification I certify that inpatient services are medically necessary for this patient for a duration of greater than two midnights  See H&P and MD Progress Notes for additional information about the patient's course of treatment  10/29/19 2232       ED Arrival Information     Expected Arrival Acuity Means of Arrival Escorted By Service Admission Type    - 10/29/2019 19:13 Emergent Ambulance [facility] ([address]) Pulmonology Emergency    Arrival Complaint    GI bleed        Chief Complaint   Patient presents with    Altered Mental Status     Per EMS pt was not at work x1 week  Coworkers went to pts house to do well fair check on pt and found covered in brown vomitus  Pt is not speaking and is looking blankly around  Assessment/Plan:    60  Y O  Female  Presents to ED  From home  Via  EMS after  being found  By  co workers covered  In coffee ground  Emesis with 2 large empty wine  Bottles, and an empty  Bottle  Of isopropyl alcohol at her side  Pt  Did not  Show up at work for  1 week  PMH, per sister, is  Functioning alcoholic, esophageal ulcerations treated medically, alcohol withdrawal and rehab, denies  Any  Alcohol induced seizure,  and recent treatment  For cellulitis  LLE since  10/19,  Po antibiotic  Bottle  Half  Full       CT  Abd/pelvis  Reveals    Moderate hiatal hernia and diffuse thickening of the esophagus with intraluminal fluid compatible with reflux esophagitis  Found with leukocytosis and MILAGRO  ADMIT IP STEPDOWN LOC  WITH   TOxIX  Metabolic  Encephalopathy,  Acute  Alcoholic gastritis with  Hemorrhage, MILAGRO, isopropyl alcohol poisoning  And leukocytosis  And plan is  GI consult, monitor labs, NPO, IVF, strict  I & O  And   Close monitor   Neuro  Status          ED Triage Vitals [10/29/19 1917]   Temperature Pulse Respirations Blood Pressure SpO2   99 4 °F (37 4 °C) (!) 136 16 113/55 97 %      Temp Source Heart Rate Source Patient Position - Orthostatic VS BP Location FiO2 (%)   Rectal Monitor Lying Right arm --      Pain Score       No Pain        Wt Readings from Last 1 Encounters:   10/30/19 83 9 kg (184 lb 15 5 oz)     Additional Vital Signs:   /30/19 1106  98 4 °F (36 9 °C)                 10/30/19 0900    94    130/48Abnormal            10/30/19 0800    110Abnormal     119/58      None (Room air)  Lying   10/30/19 0600    100  22  99/53  77  91 %       10/30/19 0500    126Abnormal   26Abnormal   100/42Abnormal   63  94 %       10/30/19 0453  97 6 °F (36 4 °C)                 10/30/19 0300    108Abnormal   8Abnormal   97/63  81         10/30/19 0200    120Abnormal   17  116/50  70  99 %       10/30/19 0100    122Abnormal   26Abnormal   109/47Abnormal            10/30/19 0050    118Abnormal   0Abnormal       100 %       10/30/19 0049    128Abnormal   12      100 %       10/30/19 0048  99 °F (37 2 °C)      Steph Groom       10/30/19 0035    131Abnormal   22  112/66    100 %  None (Room air)  Lying   10/29/19 2249    123Abnormal   20  112/55    99 %  None (Room air)  Lying   10/29/19 2139    121Abnormal   20  117/58    97 %  None (Room air)     10/29/19 2019    132Abnormal   20  117/68    98 %  None (Room air)  Lying   10/29/19 1917  99 4 °F (37 4 °C)  136Abnormal   16  113/55    97 %  None (Room air)  Lying     WA  Scores   10/29    13          14 10/30       9              6       GCS  14        Pertinent Labs/Diagnostic Test Results:   Ct  Head  ( 10/29)   No acute intracranial abnormality  Ct  Chest/abd/pelvis  ( 10/29)     Moderate hiatal hernia and diffuse thickening of the esophagus with intraluminal fluid compatible with reflux esophagitis   Correlate with endoscopy  2   Loop of bowel with intraluminal crescentic fat (series 2, image 73) which may represent a transient small bowel intussusception  Vickie Gamma is no bowel obstruction    EKG    ST    Results from last 7 days   Lab Units 10/30/19  1005 10/30/19  0303 10/29/19  1926   WBC Thousand/uL  --  18 13* 21 07*   HEMOGLOBIN g/dL  --  10 6* 12 8   I STAT HEMOGLOBIN g/dl 9 5*  --   --    HEMATOCRIT %  --  31 2* 37 8   HEMATOCRIT, ISTAT % 28*  --   --    PLATELETS Thousands/uL  --  232 315   NEUTROS ABS Thousands/µL  --  13 77* 17 47*         Results from last 7 days   Lab Units 10/30/19  1005 10/30/19  0316 10/30/19  0308 10/29/19  1926   SODIUM mmol/L  --  137  --  133*   POTASSIUM mmol/L  --  3 0*  --  3 2*   CHLORIDE mmol/L  --  102  --  94*   CO2 mmol/L  --  26  --  28   CO2, I-STAT mmol/L 27  --   --   --    AGAP mmol/L 16*  --   --   --    ANION GAP mmol/L  --  9  --  11   BUN mg/dL  --  25  --  36*   CREATININE mg/dL  --  2 41*  --  2 54*   EGFR ml/min/1 73sq m 98 21  --  20   CALCIUM mg/dL  --  7 6*  --  8 3   CALCIUM, IONIZED mmol/L  --   --  0 97*  --    CALCIUM, IONIZED, ISTAT mmol/L 0 97*  --   --   --    MAGNESIUM mg/dL  --  2 0  --   --      Results from last 7 days   Lab Units 10/30/19  0316 10/29/19  1926   AST U/L 53* 45   ALT U/L 29 27   ALK PHOS U/L 69 87   TOTAL PROTEIN g/dL 6 0* 6 7   ALBUMIN g/dL 3 0* 3 6   TOTAL BILIRUBIN mg/dL 0 32 0 35   AMMONIA umol/L  --  <10*         Results from last 7 days   Lab Units 10/30/19  0316 10/29/19  1926   GLUCOSE RANDOM mg/dL 146* 177*          Results from last 7 days   Lab Units 10/30/19  0755 10/29/19  2114   PH ART  7 518* 7 608*   PCO2 ART mm Hg 34 9* 25 0*   PO2 ART mm Hg 64 4* 66 3*   HCO3 ART mmol/L 27 7 24 4   BASE EXC ART mmol/L 4 8 4 1   O2 CONTENT ART mL/dL 14 2* 16 8   O2 HGB, ARTERIAL % 92 5* 94 1   ABG SOURCE  Radial, Right Radial, Right     Results from last 7 days   Lab Units 10/29/19  1926   PH SEGUNDO  7 615*   PCO2 SEGUNDO mm Hg 21 0*   PO2 SEGUNDO mm Hg 108 2*   HCO3 SEGUNDO mmol/L 20 9*   BASE EXC SEGUNDO mmol/L 1 5   O2 CONTENT SEGUNDO ml/dL 18 5   O2 HGB, VENOUS % 95 9*                     Results from last 7 days   Lab Units 10/29/19  1926   PROTIME seconds 14 1   INR  1 08   PTT seconds 26     Results from last 7 days   Lab Units 10/29/19  1926   TSH 3RD GENERATON uIU/mL 0 387         Results from last 7 days   Lab Units 10/30/19  0253 10/29/19  1926   LACTIC ACID mmol/L 2 2* 2 7*           Results from last 7 days   Lab Units 10/29/19  2157   CLARITY UA  Clear   COLOR UA  Yellow   SPEC GRAV UA  1 015   PH UA  7 0   GLUCOSE UA mg/dl Negative   KETONES UA mg/dl 80 (3+)*   BLOOD UA  Trace*   PROTEIN UA mg/dl Trace*   NITRITE UA  Negative   BILIRUBIN UA  Negative   UROBILINOGEN UA E U /dl 0 2   LEUKOCYTES UA  Negative   WBC UA /hpf 1-2*   RBC UA /hpf 0-1*   BACTERIA UA /hpf Occasional   EPITHELIAL CELLS WET PREP /hpf Occasional         Results from last 7 days   Lab Units 10/29/19  2155   AMPH/METH  Negative   BARBITURATE UR  Negative   BENZODIAZEPINE UR  Negative   COCAINE UR  Negative   METHADONE URINE  Negative   OPIATE UR  Negative   PCP UR  Negative   THC UR  Negative     Results from last 7 days   Lab Units 10/29/19  1926   ETHANOL LVL mg/dL <3   ACETAMINOPHEN LVL ug/mL <2*   SALICYLATE LVL mg/dL <3*             ED Treatment:   Medication Administration from 10/29/2019 1913 to 10/30/2019 0039       Date/Time Order Dose Route Action Comments     10/29/2019 2023 sodium chloride 0 9 % bolus 1,000 mL 0 mL Intravenous Stopped      10/29/2019 1925 sodium chloride 0 9 % bolus 1,000 mL 1,000 mL Intravenous New Bag      10/29/2019 2037 pantoprazole (PROTONIX) 80 mg in sodium chloride 0 9 % 100 mL IVPB 0 mg Intravenous Stopped      10/29/2019 2003 pantoprazole (PROTONIX) 80 mg in sodium chloride 0 9 % 100 mL IVPB 80 mg Intravenous New Bag      10/30/2019 0024 lactated ringers bolus 1,000 mL 0 mL Intravenous Stopped      10/29/2019 2136 lactated ringers bolus 1,000 mL 1,000 mL Intravenous New Bag      10/30/2019 0024 lactated ringers infusion 0 mL/hr Intravenous Stopped      10/29/2019 2137 lactated ringers infusion 125 mL/hr Intravenous New Bag           Admitting Diagnosis: Altered mental status [R41 82]  SIRS (systemic inflammatory response syndrome) ([facility]) [R65 10]  [facility]acute [facility] ) [N17 9]  Acute encephalopathy [G93 40]  Age/Sex: 60 y o  female  Admission Orders:    Medications:  folic acid IVPB 1 mg Intravenous Daily   heparin (porcine) 5,000 Units Subcutaneous Q8H [address]   pantoprazole 40 mg Intravenous Q12H [address]   potassium chloride 40 mEq Oral Once   potassium chloride 20 mEq Intravenous Q2H   thiamine 100 mg Intravenous Daily       lactated ringers 150 mL/hr Intravenous Continuous       LORazepam 1 mg Intravenous Q4H PRN   metoclopramide 10 mg Intravenous Q6H PRN   ondansetron 8 mg Intravenous Q8H PRN       IP CONSULT TO CASE MANAGEMENT  IP CONSULT TO TOXICOLOGY  IP CONSULT TO PSYCHIATRY     Fall precautions  CIWA  scores  Neuro checks  Q 2 hrs  Dysphagia  eval      Network Utilization Review Department  [email or web address]  ATTENTION: Please call with any questions or concerns to [phone] and carefully listen to the prompts so that you are directed to the right person  All voicemails are confidential   Yoseph Luna all requests for admission clinical reviews, approved or denied determinations and any other requests to dedicated fax number below belonging to the campus where the patient is receiving treatment    FACILITY NAME UR FAX NUMBER   ADMISSION DENIALS (Administrative/Medical Necessity) [phone] [address] (Maternity/NICU/Pediatrics) [place] [phone]   Jed Downey [phone]   Cl Zaldivar [phone]   [address] [facility] [address] [facility] [phone]   Emerald Carson 2000 [facility] [address] [place] [address] [phone]

## 2019-10-30 NOTE — ASSESSMENT & PLAN NOTE
· Admitted consumption to sister prior to EMS arrival  · Ketonuria and elevated lactate with significant respiratory alkalosis  · Poison Control/Toxicology consulted  · Cont IVF

## 2019-10-30 NOTE — ED NOTES
RN attempted to call report, but was put on hold  Will try again       Brandon Kaur RN  10/30/19 1734

## 2019-10-30 NOTE — ASSESSMENT & PLAN NOTE
· Pt found covered in 'coffee ground' emesis  · Known esophageal variceal, treated at [facility] 2015  · CT abd/pelvis - Moderate hiatal hernia and diffuse thickening of the esophagus with intraluminal fluid compatible with reflux esophagitis  · Protonix and Ceftriaxone given in ED  · Currently HD stable, Hgb 12, no vomiting since admission  · Monitor serial H/H, keep NPO  · Consult GI  · Cont Ceftriaxone for SBP prophylaxis  · Consider Nadalol if po intake is established

## 2019-10-30 NOTE — PLAN OF CARE
Problem: Potential for Falls  Goal: Patient will remain free of falls  Description  INTERVENTIONS:  - Assess patient frequently for physical needs  -  Identify cognitive and physical deficits and behaviors that affect risk of falls    -  [place] fall precautions as indicated by assessment   - Educate patient/family on patient safety including physical limitations  - Instruct patient to call for assistance with activity based on assessment  - Modify environment to reduce risk of injury  - Consider OT/PT consult to assist with strengthening/mobility  Outcome: Progressing     Problem: Prexisting or High Potential for Compromised Skin Integrity  Goal: Skin integrity is maintained or improved  Description  INTERVENTIONS:  - Identify patients at risk for skin breakdown  - Assess and monitor skin integrity  - Assess and monitor nutrition and hydration status  - Monitor labs   - Assess for incontinence   - Turn and reposition patient  - Assist with mobility/ambulation  - Relieve pressure over bony prominences  - Avoid friction and shearing  - Provide appropriate hygiene as needed including keeping skin clean and dry  - Evaluate need for skin moisturizer/barrier cream  - Collaborate with interdisciplinary team   - Patient/family teaching  - Consider wound care consult   Outcome: Progressing     Problem: PAIN - ADULT  Goal: Verbalizes/displays adequate comfort level or baseline comfort level  Description  Interventions:  - Encourage patient to monitor pain and request assistance  - Assess pain using appropriate pain scale  - Administer analgesics based on type and severity of pain and evaluate response  - Implement non-pharmacological measures as appropriate and evaluate response  - Consider cultural and social influences on pain and pain management  - Notify physician/advanced practitioner if interventions unsuccessful or patient reports new pain  Outcome: Progressing     Problem: SAFETY ADULT  Goal: Maintain or return to baseline ADL function  Description  INTERVENTIONS:  -  Assess patient's ability to carry out ADLs; assess patient's baseline for ADL function and identify physical deficits which impact ability to perform ADLs (bathing, care of mouth/teeth, toileting, grooming, dressing, etc )  - Assess/evaluate cause of self-care deficits   - Assess range of motion  - Assess patient's mobility; develop plan if impaired  - Assess patient's need for assistive devices and provide as appropriate  - Encourage maximum independence but intervene and supervise when necessary  - Involve family in performance of ADLs  - Assess for home care needs following discharge   - Consider OT consult to assist with ADL evaluation and planning for discharge  - Provide patient education as appropriate  Outcome: Progressing  Goal: Maintain or return mobility status to optimal level  Description  INTERVENTIONS:  - Assess patient's baseline mobility status (ambulation, transfers, stairs, etc )    - Identify cognitive and physical deficits and behaviors that affect mobility  - Identify mobility aids required to assist with transfers and/or ambulation (gait belt, sit-to-stand, lift, walker, cane, etc )  - [place] fall precautions as indicated by assessment  - Record patient progress and toleration of activity level on Mobility SBAR; progress patient to next Phase/Stage  - Instruct patient to call for assistance with activity based on assessment  - Consider rehabilitation consult to assist with strengthening/weightbearing, etc   Outcome: Progressing     Problem: DISCHARGE PLANNING  Goal: Discharge to home or other facility with appropriate resources  Description  INTERVENTIONS:  - Identify barriers to discharge w/patient and caregiver  - Arrange for needed discharge resources and transportation as appropriate  - Identify discharge learning needs (meds, wound care, etc )  - Arrange for interpretive services to assist at discharge as needed  - Refer to Case Management Department for coordinating discharge planning if the patient needs post-hospital services based on physician/advanced practitioner order or complex needs related to functional status, cognitive ability, or social support system  Outcome: Progressing     Problem: Knowledge Deficit  Goal: Patient/family/caregiver demonstrates understanding of disease process, treatment plan, medications, and discharge instructions  Description  Complete learning assessment and assess knowledge base    Interventions:  - Provide teaching at level of understanding  - Provide teaching via preferred learning methods  Outcome: Progressing     Problem: NEUROSENSORY - ADULT  Goal: Achieves stable or improved neurological status  Description  INTERVENTIONS  - Monitor and report changes in neurological status  - Monitor vital signs such as temperature, blood pressure, glucose, and any other labs ordered   - Initiate measures to prevent increased intracranial pressure  - Monitor for seizure activity and implement precautions if appropriate      Outcome: Progressing  Goal: Remains free of injury related to seizures activity  Description  INTERVENTIONS  - Maintain airway, patient safety  and administer oxygen as ordered  - Monitor patient for seizure activity, document and report duration and description of seizure to physician/advanced practitioner  - If seizure occurs,  ensure patient safety during seizure  - Reorient patient post seizure  - Seizure pads on all 4 side rails  - Instruct patient/family to notify RN of any seizure activity including if an aura is experienced  - Instruct patient/family to call for assistance with activity based on nursing assessment  - Administer anti-seizure medications if ordered    Outcome: Progressing  Goal: Achieves maximal functionality and self care  Description  INTERVENTIONS  - Monitor swallowing and airway patency with patient fatigue and changes in neurological status  - Encourage and assist patient to increase activity and self care     - Encourage visually impaired, hearing impaired and aphasic patients to use assistive/communication devices  Outcome: Progressing     Problem: CARDIOVASCULAR - ADULT  Goal: Maintains optimal cardiac output and hemodynamic stability  Description  INTERVENTIONS:  - Monitor I/O, vital signs and rhythm  - Monitor for S/S and trends of decreased cardiac output  - Administer and titrate ordered vasoactive medications to optimize hemodynamic stability  - Assess quality of pulses, skin color and temperature  - Assess for signs of decreased coronary artery perfusion  - Instruct patient to report change in severity of symptoms  Outcome: Progressing  Goal: Absence of cardiac dysrhythmias or at baseline rhythm  Description  INTERVENTIONS:  - Continuous cardiac monitoring, vital signs, obtain 12 lead EKG if ordered  - Administer antiarrhythmic and heart rate control medications as ordered  - Monitor electrolytes and administer replacement therapy as ordered  Outcome: Progressing     Problem: GASTROINTESTINAL - ADULT  Goal: Minimal or absence of nausea and/or vomiting  Description  INTERVENTIONS:  - Administer IV fluids if ordered to ensure adequate hydration  - Maintain NPO status until nausea and vomiting are resolved  - Nasogastric tube if ordered  - Administer ordered antiemetic medications as needed  - Provide nonpharmacologic comfort measures as appropriate  - Advance diet as tolerated, if ordered  - Consider nutrition services referral to assist patient with adequate nutrition and appropriate food choices  Outcome: Progressing  Goal: Maintains or returns to baseline bowel function  Description  INTERVENTIONS:  - Assess bowel function  - Encourage oral fluids to ensure adequate hydration  - Administer IV fluids if ordered to ensure adequate hydration  - Administer ordered medications as needed  - Encourage mobilization and activity  - Consider nutritional services referral to assist patient with adequate nutrition and appropriate food choices  Outcome: Progressing  Goal: Maintains adequate nutritional intake  Description  INTERVENTIONS:  - Monitor percentage of each meal consumed  - Identify factors contributing to decreased intake, treat as appropriate  - Assist with meals as needed  - Monitor I&O, weight, and lab values if indicated  - Obtain nutrition services referral as needed  Outcome: Progressing  Goal: Establish and maintain optimal ostomy function  Description  INTERVENTIONS:  - Assess bowel function  - Encourage oral fluids to ensure adequate hydration  - Administer IV fluids if ordered to ensure adequate hydration   - Administer ordered medications as needed  - Encourage mobilization and activity  - Nutrition services referral to assist patient with appropriate food choices  - Assess stoma site  - Consider wound care consult   Outcome: Progressing     Problem: METABOLIC, FLUID AND ELECTROLYTES - ADULT  Goal: Electrolytes maintained within normal limits  Description  INTERVENTIONS:  - Monitor labs and assess patient for signs and symptoms of electrolyte imbalances  - Administer electrolyte replacement as ordered  - Monitor response to electrolyte replacements, including repeat lab results as appropriate  - Instruct patient on fluid and nutrition as appropriate  Outcome: Progressing  Goal: Fluid balance maintained  Description  INTERVENTIONS:  - Monitor labs   - Monitor I/O and WT  - Instruct patient on fluid and nutrition as appropriate  - Assess for signs & symptoms of volume excess or deficit  Outcome: Progressing  Goal: Glucose maintained within target range  Description  INTERVENTIONS:  - Monitor Blood Glucose as ordered  - Assess for signs and symptoms of hyperglycemia and hypoglycemia  - Administer ordered medications to maintain glucose within target range  - Assess nutritional intake and initiate nutrition service referral as needed  Outcome: Progressing

## 2019-10-30 NOTE — CONSULTS
Consultation - Medical Toxicology  Darek Oneal 60 y o  female MRN: [id number]  Unit/Bed#: ICU 15 Encounter: [id number]      Reason for Consult / Principal Problem: Isopropanol ingestion  Inpatient consult to Toxicology  Consult performed by: Katerina Jama MD  Consult ordered by: BRANDO Rosa        10/30/19      ASSESSMENT:  1) Isopropanol ingestion  2) False elevation in creatinine on BMP  3) Gastritis  4) Anemia  5) Dehydration  6) Respiratory alkalosis with likely additional metabolic alkalosis  7) Hypokalemia  8) Leukocytosis  9) Tachycardia  10) Hypotension    DISCUSSION/ RECOMMENDATIONS:  The patient presented after ingestion of isopropanol with development of gastritis, nausea and vomiting  There was question of coffee-ground emesis  The patient does admit to drinking about half a bottle of isopropanol but denies this being a self-harm attempt  She evidently initially was encephalopathic which was likely secondary to intoxication from the isopropanol  At this point this seems to have resolved  Isopropanol ingestion causes a high degree of intoxication, more than is generally seen with equal amounts of ethanol  It is very irritating to the stomach and causes gastritis, and in severe cases can cause hemorrhagic gastritis  The metabolite of isopropanol is acetone, which is a terminal ketone and so there is no further metabolism  Therefore while isopropanol itself can cause an elevated osmolar gap, its metaboloism does not lead to a metabolic acidosis or elevated anion gap  Treatment of GI symptoms and hemorrhagic gastritis is supportive  Generally hemorrhagic gastritis is self limited, however in severe cases there can be significant erosion, and if there is concern for significant ongoing hemorrhage I would recommend consulting Gastroenterology  Patient does have anemia on this morning's labs with hemoglobin of 10 6  ICU team is checking a Hemoccult this morning      Our hospital system uses a colorometric method call the Abhishek reaction to assess serum creatinine on the BMP  Acetone, the metabolite of isopropanol, interferes with this reaction and commonly causes a false elevation in creatinine  However the I-STAT analyzer uses a different method to assess creatinine and so on initial consultation this morning I recommended checking an I-STAT chemistry  Creatinine from the I-STAT was 0 6  This supports that the elevated creatinine on the BMP is a false elevation  I do not think that the patient has an actual kidney injury and since her creatinine is normal on i-STAT this morning, there is no need to recheck a BMP from the standpoint of concern for renal dysfunction  If there is any need to recheck the patient's creatinine while she is in the hospital, I would recommend using the I-STAT instead of the BMP as she will continue to have false elevation of creatinine on the BMP for some time  The patient presented with alkalemia with primary respiratory alkalosis, as well as what appears to be some component of additional metabolic alkalosis  This is improving this morning  I think these findings are most likely secondary to hyperventilation due to anxiety, and to volume losses from vomiting  Salicylate was negative  Hypokalemia also likely secondary to fluid losses from vomiting, continue repletion  On my evaluation this morning the patient had mild hypotension and mild tachycardia which I would attribute to dehydration from significant amount of vomiting secondary to the gastritis  Please continue IV fluids  I do not have any reason to suspect any additional overdose in this patient and she does not appear to be on any medications which would cause these findings  Patient does have a leukocytosis which is slightly improved this morning  I think this is likely acute phase reaction    Further evaluation and workup as felt appropriate per ICU team     The patient does endorse history of alcohol withdrawal but denies any history of seizures or delirium tremens  She has been placed on the CIWA protocol and can be treated as needed for development of withdrawal   If she is not doing well on CIWA protocol, please contact me and we can discuss initiation of phenobarbital       For further questions, please call K[facility] Champion  Service or [facility] Kalyn Jimenez to reach the medical  on call  HPI: Kamini Lawton is a 60y o  year old female who presents with isopropanol ingestion  The patient admits to drinking half a bottle of isopropanol last night  She states that she did this to get drunk and that she was not trying to harm herself  She does admit to drinking alcohol regularly, although she will not tell me exactly how much and how frequently  Patient states that after the ingestion she had onset of abdominal pain along with nausea and vomiting  She does not recall how many times she vomited but thinks it was several times  There was report of coffee-ground emesis, although the patient is uncertain if she had this or if she vomited blood  She does continue to complain of significant epigastric pain and nausea this morning  Patient denies any chest pain, trouble breathing, back pain  Denies fever or chills  Denies any recent illness  Patient denies any other ingestions  She does endorse history of alcohol withdrawal, but denies any history of seizures or DT's  Review of Systems   Constitutional: Negative for chills and fever  HENT: Negative  Eyes: Negative  Respiratory: Negative for cough and shortness of breath  Cardiovascular: Negative for chest pain and palpitations  Gastrointestinal: Positive for abdominal pain, nausea and vomiting  Negative for diarrhea  Endocrine: Negative  Genitourinary: Negative  Musculoskeletal: Negative  Skin: Negative  Neurological: Negative for dizziness and headaches  Hematological: Negative  Psychiatric/Behavioral: Negative for dysphoric mood and suicidal ideas  Historical Information   Past Medical History:   Diagnosis Date    Arthritis     Disease of thyroid gland     Esophageal ulceration      History reviewed  No pertinent surgical history  Social History   Social History     Substance and Sexual Activity   Alcohol Use Yes     Social History     Substance and Sexual Activity   Drug Use Not on file     Social History     Tobacco Use   Smoking Status Unknown If Ever Smoked     History reviewed  No pertinent family history  Prior to Admission medications    Medication Sig Start Date End Date Taking?  Authorizing Provider   adalimumab (HUMIRA) 20 mg/0 4 mL PSKT injection Inject 20 mg under the skin once   Yes Historical Provider, MD   doxycycline hyclate (VIBRAMYCIN) 100 mg capsule Take 100 mg by mouth every 12 (twelve) hours   Yes Historical Provider, MD       Current Facility-Administered Medications   Medication Dose Route Frequency    folic acid 1 mg in sodium chloride 0 9 % 50 mL IVPB  1 mg Intravenous Daily    heparin (porcine) subcutaneous injection 5,000 Units  5,000 Units Subcutaneous Q8H [address]    lactated ringers infusion  150 mL/hr Intravenous Continuous    LORazepam (ATIVAN) 2 mg/mL injection 1 mg  1 mg Intravenous Q4H PRN    ondansetron (ZOFRAN) 8 mg in sodium chloride 0 9 % 50 mL IVPB  8 mg Intravenous Q8H PRN    pantoprazole (PROTONIX) injection 40 mg  40 mg Intravenous Q12H [address]    potassium chloride (K-DUR,KLOR-CON) CR tablet 40 mEq  40 mEq Oral Once    potassium chloride 20 mEq IVPB (premix)  20 mEq Intravenous Q2H    thiamine (VITAMIN B1) 100 mg in sodium chloride 0 9 % 50 mL IVPB  100 mg Intravenous Daily       No Known Allergies    Objective       Intake/Output Summary (Last 24 hours) at 10/30/2019 1025  Last data filed at 10/30/2019 0600  Gross per 24 hour   Intake 3352 09 ml   Output 700 ml   Net 2652 09 ml       Invasive Devices:   Peripheral IV 10/29/19 Left Antecubital (Active)   Site Assessment Clean;Dry; Intact 10/30/2019  8:00 AM   Dressing Type Transparent 10/30/2019  8:00 AM   Line Status Flushed;Saline locked 10/30/2019  8:00 AM   Dressing Status Clean; Intact;Dry 10/30/2019  8:00 AM       Peripheral IV 10/30/19 Right Wrist (Active)   Site Assessment Clean;Dry; Intact 10/30/2019  8:00 AM   Dressing Type Transparent 10/30/2019  8:00 AM   Line Status Infusing 10/30/2019  8:00 AM   Dressing Status Clean;Dry; Intact 10/30/2019  8:00 AM       Vitals   Vitals:    10/30/19 0500 10/30/19 0600 10/30/19 0800 10/30/19 0900   BP: (!) 100/42 99/53 119/58 (!) 130/48   TempSrc:       Pulse: (!) 126 100 (!) 110 94   Resp: (!) 26 22     Patient Position - Orthostatic VS:   Lying    Temp:           Physical Exam   Constitutional: She is oriented to person, place, and time  She appears well-developed and well-nourished  No distress  HENT:   Head: Normocephalic and atraumatic  Mouth/Throat: Oropharynx is clear and moist    Eyes: Pupils are equal, round, and reactive to light  Conjunctivae are normal    Neck: Normal range of motion  Neck supple  Cardiovascular: Regular rhythm, normal heart sounds and intact distal pulses  Tachycardic   Pulmonary/Chest: Effort normal and breath sounds normal  No respiratory distress  Abdominal: Soft  She exhibits no distension  Mild epigastric tenderness with no rebound or guarding   Musculoskeletal: Normal range of motion  She exhibits no edema  Neurological: She is alert and oriented to person, place, and time  No focal deficit  Normal muscle tone  2+ bilateral patellar DTRs  No clonus  Skin: Skin is warm and dry  Psychiatric: She has a normal mood and affect  Vitals reviewed  Lab Results: I have personally reviewed pertinent reports        Labs:  Results from last 7 days   Lab Units 10/30/19  1005 10/30/19  0303   WBC Thousand/uL  --  18 13*   HEMOGLOBIN g/dL  --  10 6*   I STAT HEMOGLOBIN g/dl 9 5*  --    HEMATOCRIT % --  31 2*   HEMATOCRIT, ISTAT % 28*  --    PLATELETS Thousands/uL  --  232   NEUTROS PCT %  --  76*   LYMPHS PCT %  --  16   MONOS PCT %  --  7      Results from last 7 days   Lab Units 10/30/19  1005 10/30/19  0316   POTASSIUM mmol/L  --  3 0*   CHLORIDE mmol/L  --  102   CO2 mmol/L  --  26   CO2, I-STAT mmol/L 27  --    BUN mg/dL  --  25   CREATININE mg/dL  --  2 41*   CALCIUM mg/dL  --  7 6*   ALK PHOS U/L  --  69   ALT U/L  --  29   AST U/L  --  53*   GLUCOSE, ISTAT mg/dl 137  --    MAGNESIUM mg/dL  --  2 0      Results from last 7 days   Lab Units 10/29/19  1926   INR  1 08   PTT seconds 26     Results from last 7 days   Lab Units 10/30/19  0253 10/29/19  1926   LACTIC ACID mmol/L 2 2* 2 7*     No results found for: TROPONINI  Results from last 7 days   Lab Units 10/29/19  1926   PH SEGUNDO  7 615*   PCO2 SEGUNDO mm Hg 21 0*   PO2 SEGUNDO mm Hg 108 2*   HCO3 SEGUNDO mmol/L 20 9*   O2 CONTENT SEGUNDO ml/dL 18 5   O2 HGB, VENOUS % 95 9*     Results from last 7 days   Lab Units 10/29/19  1926   ACETAMINOPHEN LVL ug/mL <2*   ETHANOL LVL mg/dL <3   SALICYLATE LVL mg/dL <3*     Invalid input(s): EXTPREGUR    Imaging Studies: I have personally reviewed pertinent reports  Counseling / Coordination of Care  Total floor / unit time spent today 45 minutes  Greater than 50% of total time was spent with the patient and / or family counseling and / or coordination of care       Minutes of critical care time: 45 minutes  -Critical care time was exclusive of seperately bilable procedures and treating other patients as well as teaching time    -Critical care was necessary to treat or prevent imminent or life-threatening deterioration of the following condition: circulatory failure, dehydration  -Critical care time was spent personally by me on the following activities as well as the above as per the rest of chart: obtaining history from patient/surrogate, developement of a treatment plan, discussions with primary provider(s), examination of the patient, recommending treatements and interventions, recommending/reviewing laboratory studies, recommending/reviewing of radiographic studies

## 2019-10-30 NOTE — ASSESSMENT & PLAN NOTE
· Baseline Cr 1 year ago 0 7  · Likely dehydration  · 2L IVF bolus given in ED, start IVF for rehydration  · Monitor BMP, strict I/O

## 2019-10-30 NOTE — ED NOTES
RN spoke to Maria Elena Phillips from respiratory at this time to perform ABG        Argentina Lui RN  10/29/19 2033

## 2019-10-30 NOTE — ASSESSMENT & PLAN NOTE
· Presumed cellulitis on Left foot, did not complete outpatient course of Keflex  · Likely SIRS response, cont Ceftriaxone for prophylactic SBP coverage, can d/c if no further evidence of GIB  · Trend WBC and procalcitonin

## 2019-10-30 NOTE — H&P
H&P- Estefania Dawson 1955, 60 y o  female MRN: [id number]    Unit/Bed#: ED 15 Encounter: [id number]    Primary Care Provider: No primary care provider on file     Date and time admitted to hospital: 10/29/2019  7:20 PM        Isopropyl alcohol poisoning  Assessment & Plan  · Admitted consumption to sister prior to EMS arrival  · Ketonuria and elevated lactate with significant respiratory alkalosis  · Poison Control/Toxicology consulted  · Cont IVF    Acute alcoholic gastritis with hemorrhage  Assessment & Plan  · Pt found covered in 'coffee ground' emesis  · Known esophageal variceal, treated at [facility] 2015  · CT abd/pelvis - Moderate hiatal hernia and diffuse thickening of the esophagus with intraluminal fluid compatible with reflux esophagitis  · Protonix and Ceftriaxone given in ED  · Currently HD stable, Hgb 12, no vomiting since admission  · Monitor serial H/H, keep NPO  · Consult GI  · Cont Ceftriaxone for SBP prophylaxis  · Consider Nadalol if po intake is established    * Toxic metabolic encephalopathy  Assessment & Plan  · Unclear source, likely ETOH driven  · Coma panel negative, UDS negative, ammonia level normal, CT head negative  · Trend Neuro exam    MILAGRO (acute kidney injury) ([facility] [address] )  Assessment & Plan  · Baseline Cr 1 year ago 0 7  · Likely dehydration  · 2L IVF bolus given in ED, start IVF for rehydration  · Monitor BMP, strict I/O    Leukocytosis  Assessment & Plan  · Presumed cellulitis on Left foot, did not complete outpatient course of Keflex  · Likely SIRS response, cont Ceftriaxone for prophylactic SBP coverage, can d/c if no further evidence of GIB  · Trend WBC and procalcitonin    Chronic alcoholism (HCC)  Assessment & Plan  · CIWA protocol  · Thiamine    Hypokalemia  Assessment & Plan  · Monitor BMP, Mg, and Phos replete as needed      -------------------------------------------------------------------------------------------------------------  Chief Complaint: unable to provide, pt not responding to questions verbally    History of Present Illness   HX and PE limited by: pt's inability to respond verbally    Isac Camejo is a 60 y o  female who presents with presumed GIB as well as alcohol toxicity vs withdrawal   Per pt's sister, she is a "functioning alcoholic" however did not show up for work for apx 1 week  Sister found the patient at home covered in 'coffee ground' emesis with 2 large empty wine bottles as well as an empty bottle of isopropyl alcohol, which she admitted to drinking  Pt has a significant history of esophageal ulcerations in 2016 which were medically treated at [facility]  Upon exam pt would not verbalize responses however followed all commands  Pt has not vomited since admission to the ED  Pt's sister stated she was being treated for presumed cellulitis of her Left LE starting on 10/19, however the Keflex prescription appeared half full  Sister also admitted pt has gone through withdrawal and rehab prior but denied any alcohol induced seizures  Pt has two daughters who live out of town  History obtained from sister  -------------------------------------------------------------------------------------------------------------  Dispo: Transfer to Stepdown Level 1     Code Status: No Order  --------------------------------------------------------------------------------------------------------------  Review of Systems    Review of systems was unable to be performed secondary to encephalopathy    Physical Exam   Constitutional: She appears well-developed and well-nourished  HENT:   Head: Normocephalic and atraumatic  Oropharynx dry with acetone breath   Eyes: Pupils are equal, round, and reactive to light  EOM are normal    Neck: Normal range of motion  Neck supple  No JVD present  No tracheal deviation present  Cardiovascular: Regular rhythm, S1 normal, S2 normal and normal pulses  Tachycardia present  Murmur heard    Pulmonary/Chest: Breath sounds normal  No accessory muscle usage  Tachypnea noted  No respiratory distress  Abdominal: Soft  Normal appearance and bowel sounds are normal  There is no tenderness  Musculoskeletal: Normal range of motion  She exhibits no edema or tenderness  Neurological: She is alert  She has normal strength  She is disoriented  No cranial nerve deficit or sensory deficit  GCS eye subscore is 4  GCS verbal subscore is 1  GCS motor subscore is 6  Skin: Skin is warm and dry  Capillary refill takes less than 2 seconds  Left anterior foot pink with peeled skin  Left upper anterior thigh two scratch marks   Psychiatric: She is noncommunicative  She is inattentive      --------------------------------------------------------------------------------------------------------------  Historical Information   Past Medical History:   Diagnosis Date    Arthritis     Disease of thyroid gland     Esophageal ulceration      History reviewed  No pertinent surgical history  Social History   Social History     Substance and Sexual Activity   Alcohol Use Yes     Social History     Substance and Sexual Activity   Drug Use Not on file     Social History     Tobacco Use   Smoking Status Unknown If Ever Smoked     Exercise History: unknown  Family History: I have reviewed this patient's family history and commented on sigificant items within the HPI and This patient has no significant family history    Vitals:   Vitals:    10/29/19 1917 10/29/19 2019 10/29/19 2139 10/29/19 2249   BP: 113/55 117/68 117/58 112/55   BP Location: Right arm Right arm Right arm Left arm   Pulse: (!) 136 (!) 132 (!) 121 (!) 123   Resp: 16 20 20 20   Temp: 99 4 °F (37 4 °C)      TempSrc: Rectal      SpO2: 97% 98% 97% 99%   Weight: 89 3 kg (196 lb 13 9 oz)        Temp  Min: 99 4 °F (37 4 °C)  Max: 99 4 °F (37 4 °C)  IBW: -92 5 kg     There is no height or weight on file to calculate BMI        Medications:  Current Facility-Administered Medications   Medication Dose Route Frequency    lactated ringers infusion  125 mL/hr Intravenous Continuous    LORazepam (ATIVAN) 2 mg/mL injection 1 mg  1 mg Intravenous Q4H PRN     Home medications:  Prior to Admission Medications   Prescriptions Last Dose Informant Patient Reported? Taking?    adalimumab (HUMIRA) 20 mg/0 4 mL PSKT injection  Family Member Yes Yes   Sig: Inject 20 mg under the skin once   doxycycline hyclate (VIBRAMYCIN) 100 mg capsule  Family Member Yes Yes   Sig: Take 100 mg by mouth every 12 (twelve) hours      Facility-Administered Medications: None     Allergies:  No Known Allergies      Laboratory and Diagnostics:  Results from last 7 days   Lab Units 10/29/19  1926   WBC Thousand/uL 21 07*   HEMOGLOBIN g/dL 12 8   HEMATOCRIT % 37 8   PLATELETS Thousands/uL 315   NEUTROS PCT % 83*   MONOS PCT % 6     Results from last 7 days   Lab Units 10/29/19  1926   SODIUM mmol/L 133*   POTASSIUM mmol/L 3 2*   CHLORIDE mmol/L 94*   CO2 mmol/L 28   ANION GAP mmol/L 11   BUN mg/dL 36*   CREATININE mg/dL 2 54*   CALCIUM mg/dL 8 3   GLUCOSE RANDOM mg/dL 177*   ALT U/L 27   AST U/L 45   ALK PHOS U/L 87   ALBUMIN g/dL 3 6   TOTAL BILIRUBIN mg/dL 0 35          Results from last 7 days   Lab Units 10/29/19  1926   INR  1 08   PTT seconds 26          Results from last 7 days   Lab Units 10/29/19  1926   LACTIC ACID mmol/L 2 7*     ABG:  Results from last 7 days   Lab Units 10/29/19  2114   PH ART  7 608*   PCO2 ART mm Hg 25 0*   PO2 ART mm Hg 66 3*   HCO3 ART mmol/L 24 4   BASE EXC ART mmol/L 4 1   ABG SOURCE  Radial, Right     VBG:  Results from last 7 days   Lab Units 10/29/19  2114 10/29/19  1926   PH SEGUNDO   --  7 615*   PCO2 SEGUNDO mm Hg  --  21 0*   PO2 SEGUNDO mm Hg  --  108 2*   HCO3 SEGUNDO mmol/L  --  20 9*   BASE EXC SEGUNDO mmol/L  --  1 5   ABG SOURCE  Radial, Right  --            Micro:          EKG: Sinus Tachycardia - rate 130  Imaging: I have personally reviewed pertinent films in PACS    ------------------------------------------------------------------------------------------------------------  Advance Directive and Living Will:      Power of :    POLST:    ------------------------------------------------------------------------------------------------------------  Anticipated Length of Stay is > 2 midnights    Counseling / [address]        Portions of the record may have been created with voice recognition software  Occasional wrong word or "sound a like" substitutions may have occurred due to the inherent limitations of voice recognition software    Read the chart carefully and recognize, using context, where substitutions have occurred

## 2019-10-30 NOTE — ED NOTES
RN informed pt that she needs urine specimen, pt looked at RN indicating she understood what was being stated to her         Altaf Dykes, JACOB  10/29/19 2027

## 2019-10-30 NOTE — QUICK NOTE
Spoke to [address] RN, regarding isopropyl alcohol ingestion  Reccommdation to continue supportive care with fluids  Call back if any unexplained changes

## 2019-10-31 LAB
ANION GAP SERPL CALCULATED.3IONS-SCNC: 5 MMOL/L (ref 4–13)
BASOPHILS # BLD AUTO: 0.01 THOUSANDS/ΜL (ref 0–0.1)
BASOPHILS NFR BLD AUTO: 0 % (ref 0–1)
BUN SERPL-MCNC: 10 MG/DL (ref 5–25)
CA-I BLD-SCNC: 1.04 MMOL/L (ref 1.12–1.32)
CALCIUM SERPL-MCNC: 8.1 MG/DL (ref 8.3–10.1)
CHLORIDE SERPL-SCNC: 106 MMOL/L (ref 100–108)
CO2 SERPL-SCNC: 28 MMOL/L (ref 21–32)
CREAT SERPL-MCNC: 1.42 MG/DL (ref 0.6–1.3)
EOSINOPHIL # BLD AUTO: 0 THOUSAND/ΜL (ref 0–0.61)
EOSINOPHIL NFR BLD AUTO: 0 % (ref 0–6)
ERYTHROCYTE [DISTWIDTH] IN BLOOD BY AUTOMATED COUNT: 13.2 % (ref 11.6–15.1)
ERYTHROCYTE [DISTWIDTH] IN BLOOD BY AUTOMATED COUNT: 13.6 % (ref 11.6–15.1)
GFR SERPL CREATININE-BSD FRML MDRD: 40 ML/MIN/1.73SQ M
GLUCOSE SERPL-MCNC: 105 MG/DL (ref 65–140)
HCT VFR BLD AUTO: 27.7 % (ref 34.8–46.1)
HCT VFR BLD AUTO: 27.9 % (ref 34.8–46.1)
HGB BLD-MCNC: 8.9 G/DL (ref 11.5–15.4)
HGB BLD-MCNC: 9.1 G/DL (ref 11.5–15.4)
IMM GRANULOCYTES # BLD AUTO: 0.09 THOUSAND/UL (ref 0–0.2)
IMM GRANULOCYTES NFR BLD AUTO: 1 % (ref 0–2)
LYMPHOCYTES # BLD AUTO: 2.31 THOUSANDS/ΜL (ref 0.6–4.47)
LYMPHOCYTES NFR BLD AUTO: 29 % (ref 14–44)
MAGNESIUM SERPL-MCNC: 2.2 MG/DL (ref 1.6–2.6)
MCH RBC QN AUTO: 30.1 PG (ref 26.8–34.3)
MCH RBC QN AUTO: 31.1 PG (ref 26.8–34.3)
MCHC RBC AUTO-ENTMCNC: 31.9 G/DL (ref 31.4–37.4)
MCHC RBC AUTO-ENTMCNC: 32.9 G/DL (ref 31.4–37.4)
MCV RBC AUTO: 94 FL (ref 82–98)
MCV RBC AUTO: 95 FL (ref 82–98)
MONOCYTES # BLD AUTO: 0.65 THOUSAND/ΜL (ref 0.17–1.22)
MONOCYTES NFR BLD AUTO: 8 % (ref 4–12)
NEUTROPHILS # BLD AUTO: 4.85 THOUSANDS/ΜL (ref 1.85–7.62)
NEUTS SEG NFR BLD AUTO: 62 % (ref 43–75)
NRBC BLD AUTO-RTO: 0 /100 WBCS
PLATELET # BLD AUTO: 129 THOUSANDS/UL (ref 149–390)
PLATELET # BLD AUTO: 139 THOUSANDS/UL (ref 149–390)
PMV BLD AUTO: 10.1 FL (ref 8.9–12.7)
PMV BLD AUTO: 10.3 FL (ref 8.9–12.7)
POTASSIUM SERPL-SCNC: 3.1 MMOL/L (ref 3.5–5.3)
POTASSIUM SERPL-SCNC: 3.1 MMOL/L (ref 3.5–5.3)
RBC # BLD AUTO: 2.93 MILLION/UL (ref 3.81–5.12)
RBC # BLD AUTO: 2.96 MILLION/UL (ref 3.81–5.12)
SODIUM SERPL-SCNC: 139 MMOL/L (ref 136–145)
WBC # BLD AUTO: 7.08 THOUSAND/UL (ref 4.31–10.16)
WBC # BLD AUTO: 7.91 THOUSAND/UL (ref 4.31–10.16)

## 2019-10-31 PROCEDURE — 99233 SBSQ HOSP IP/OBS HIGH 50: CPT | Performed by: INTERNAL MEDICINE

## 2019-10-31 PROCEDURE — 99252 IP/OBS CONSLTJ NEW/EST SF 35: CPT | Performed by: PHYSICIAN ASSISTANT

## 2019-10-31 PROCEDURE — 80048 BASIC METABOLIC PNL TOTAL CA: CPT | Performed by: NURSE PRACTITIONER

## 2019-10-31 PROCEDURE — C9113 INJ PANTOPRAZOLE SODIUM, VIA: HCPCS | Performed by: NURSE PRACTITIONER

## 2019-10-31 PROCEDURE — 85027 COMPLETE CBC AUTOMATED: CPT | Performed by: NURSE PRACTITIONER

## 2019-10-31 PROCEDURE — 82330 ASSAY OF CALCIUM: CPT | Performed by: NURSE PRACTITIONER

## 2019-10-31 PROCEDURE — 90682 RIV4 VACC RECOMBINANT DNA IM: CPT | Performed by: INTERNAL MEDICINE

## 2019-10-31 PROCEDURE — 84132 ASSAY OF SERUM POTASSIUM: CPT | Performed by: NURSE PRACTITIONER

## 2019-10-31 PROCEDURE — 85025 COMPLETE CBC W/AUTO DIFF WBC: CPT | Performed by: NURSE PRACTITIONER

## 2019-10-31 PROCEDURE — 83735 ASSAY OF MAGNESIUM: CPT | Performed by: NURSE PRACTITIONER

## 2019-10-31 PROCEDURE — NC001 PR NO CHARGE: Performed by: NURSE PRACTITIONER

## 2019-10-31 RX ORDER — THIAMINE MONONITRATE (VIT B1) 100 MG
100 TABLET ORAL DAILY
Status: DISCONTINUED | OUTPATIENT
Start: 2019-11-01 | End: 2019-11-02 | Stop reason: HOSPADM

## 2019-10-31 RX ORDER — FOLIC ACID 1 MG/1
1 TABLET ORAL DAILY
Status: DISCONTINUED | OUTPATIENT
Start: 2019-11-01 | End: 2019-11-02 | Stop reason: HOSPADM

## 2019-10-31 RX ORDER — POTASSIUM CHLORIDE 14.9 MG/ML
20 INJECTION INTRAVENOUS
Status: COMPLETED | OUTPATIENT
Start: 2019-10-31 | End: 2019-10-31

## 2019-10-31 RX ORDER — LANOLIN ALCOHOL/MO/W.PET/CERES
3 CREAM (GRAM) TOPICAL
Status: DISCONTINUED | OUTPATIENT
Start: 2019-10-31 | End: 2019-11-02 | Stop reason: HOSPADM

## 2019-10-31 RX ADMIN — SODIUM CHLORIDE, SODIUM LACTATE, POTASSIUM CHLORIDE, AND CALCIUM CHLORIDE 150 ML/HR: .6; .31; .03; .02 INJECTION, SOLUTION INTRAVENOUS at 04:56

## 2019-10-31 RX ADMIN — INFLUENZA A VIRUS A/BRISBANE/02/2018 (H1N1) RECOMBINANT HEMAGGLUTININ ANTIGEN, INFLUENZA A VIRUS A/KANSAS/14/2017 (H3N2) RECOMBINANT HEMAGGLUTININ ANTIGEN, INFLUENZA B VIRUS B/PHUKET/3073/2013 RECOMBINANT HEMAGGLUTININ ANTIGEN, AND INFLUENZA B VIRUS B/MARYLAND/15/2016 RECOMBINANT HEMAGGLUTININ ANTIGEN 0.5 ML: 45; 45; 45; 45 INJECTION INTRAMUSCULAR at 12:21

## 2019-10-31 RX ADMIN — POTASSIUM CHLORIDE 20 MEQ: 200 INJECTION, SOLUTION INTRAVENOUS at 12:56

## 2019-10-31 RX ADMIN — PANTOPRAZOLE SODIUM 40 MG: 40 INJECTION, POWDER, FOR SOLUTION INTRAVENOUS at 21:09

## 2019-10-31 RX ADMIN — HEPARIN SODIUM 5000 UNITS: 5000 INJECTION INTRAVENOUS; SUBCUTANEOUS at 21:09

## 2019-10-31 RX ADMIN — FOLIC ACID 1 MG: 5 INJECTION, SOLUTION INTRAMUSCULAR; INTRAVENOUS; SUBCUTANEOUS at 08:54

## 2019-10-31 RX ADMIN — MELATONIN 3 MG: 3 TAB ORAL at 21:33

## 2019-10-31 RX ADMIN — HEPARIN SODIUM 5000 UNITS: 5000 INJECTION INTRAVENOUS; SUBCUTANEOUS at 05:00

## 2019-10-31 RX ADMIN — HEPARIN SODIUM 5000 UNITS: 5000 INJECTION INTRAVENOUS; SUBCUTANEOUS at 13:26

## 2019-10-31 RX ADMIN — POTASSIUM CHLORIDE 20 MEQ: 200 INJECTION, SOLUTION INTRAVENOUS at 08:49

## 2019-10-31 RX ADMIN — POTASSIUM CHLORIDE 20 MEQ: 200 INJECTION, SOLUTION INTRAVENOUS at 10:56

## 2019-10-31 RX ADMIN — PANTOPRAZOLE SODIUM 40 MG: 40 INJECTION, POWDER, FOR SOLUTION INTRAVENOUS at 08:49

## 2019-10-31 RX ADMIN — METOCLOPRAMIDE 10 MG: 5 INJECTION, SOLUTION INTRAMUSCULAR; INTRAVENOUS at 09:32

## 2019-10-31 RX ADMIN — THIAMINE HYDROCHLORIDE 100 MG: 100 INJECTION, SOLUTION INTRAMUSCULAR; INTRAVENOUS at 08:54

## 2019-10-31 NOTE — ASSESSMENT & PLAN NOTE
· Likely isopropyl and ETOH withdrawl  · Coma panel negative, UDS negative, ammonia level normal, CT head negative  · Trend Neuro exam  · Continue CIWA protocol
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
## 2019-10-31 NOTE — PLAN OF CARE
Problem: Potential for Falls  Goal: Patient will remain free of falls  Description  INTERVENTIONS:  - Assess patient frequently for physical needs  -  Identify cognitive and physical deficits and behaviors that affect risk of falls    -  [place] fall precautions as indicated by assessment   - Educate patient/family on patient safety including physical limitations  - Instruct patient to call for assistance with activity based on assessment  - Modify environment to reduce risk of injury  - Consider OT/PT consult to assist with strengthening/mobility  Outcome: Progressing     Problem: Prexisting or High Potential for Compromised Skin Integrity  Goal: Skin integrity is maintained or improved  Description  INTERVENTIONS:  - Identify patients at risk for skin breakdown  - Assess and monitor skin integrity  - Assess and monitor nutrition and hydration status  - Monitor labs   - Assess for incontinence   - Turn and reposition patient  - Assist with mobility/ambulation  - Relieve pressure over bony prominences  - Avoid friction and shearing  - Provide appropriate hygiene as needed including keeping skin clean and dry  - Evaluate need for skin moisturizer/barrier cream  - Collaborate with interdisciplinary team   - Patient/family teaching  - Consider wound care consult   Outcome: Progressing     Problem: PAIN - ADULT  Goal: Verbalizes/displays adequate comfort level or baseline comfort level  Description  Interventions:  - Encourage patient to monitor pain and request assistance  - Assess pain using appropriate pain scale  - Administer analgesics based on type and severity of pain and evaluate response  - Implement non-pharmacological measures as appropriate and evaluate response  - Consider cultural and social influences on pain and pain management  - Notify physician/advanced practitioner if interventions unsuccessful or patient reports new pain  Outcome: Progressing     Problem: SAFETY ADULT  Goal: Maintain or return to baseline ADL function  Description  INTERVENTIONS:  -  Assess patient's ability to carry out ADLs; assess patient's baseline for ADL function and identify physical deficits which impact ability to perform ADLs (bathing, care of mouth/teeth, toileting, grooming, dressing, etc )  - Assess/evaluate cause of self-care deficits   - Assess range of motion  - Assess patient's mobility; develop plan if impaired  - Assess patient's need for assistive devices and provide as appropriate  - Encourage maximum independence but intervene and supervise when necessary  - Involve family in performance of ADLs  - Assess for home care needs following discharge   - Consider OT consult to assist with ADL evaluation and planning for discharge  - Provide patient education as appropriate  Outcome: Progressing  Goal: Maintain or return mobility status to optimal level  Description  INTERVENTIONS:  - Assess patient's baseline mobility status (ambulation, transfers, stairs, etc )    - Identify cognitive and physical deficits and behaviors that affect mobility  - Identify mobility aids required to assist with transfers and/or ambulation (gait belt, sit-to-stand, lift, walker, cane, etc )  - [place] fall precautions as indicated by assessment  - Record patient progress and toleration of activity level on Mobility SBAR; progress patient to next Phase/Stage  - Instruct patient to call for assistance with activity based on assessment  - Consider rehabilitation consult to assist with strengthening/weightbearing, etc   Outcome: Progressing     Problem: DISCHARGE PLANNING  Goal: Discharge to home or other facility with appropriate resources  Description  INTERVENTIONS:  - Identify barriers to discharge w/patient and caregiver  - Arrange for needed discharge resources and transportation as appropriate  - Identify discharge learning needs (meds, wound care, etc )  - Arrange for interpretive services to assist at discharge as needed  - Refer to Case Management Department for coordinating discharge planning if the patient needs post-hospital services based on physician/advanced practitioner order or complex needs related to functional status, cognitive ability, or social support system  Outcome: Progressing     Problem: Knowledge Deficit  Goal: Patient/family/caregiver demonstrates understanding of disease process, treatment plan, medications, and discharge instructions  Description  Complete learning assessment and assess knowledge base    Interventions:  - Provide teaching at level of understanding  - Provide teaching via preferred learning methods  Outcome: Progressing     Problem: NEUROSENSORY - ADULT  Goal: Achieves stable or improved neurological status  Description  INTERVENTIONS  - Monitor and report changes in neurological status  - Monitor vital signs such as temperature, blood pressure, glucose, and any other labs ordered   - Initiate measures to prevent increased intracranial pressure  - Monitor for seizure activity and implement precautions if appropriate      Outcome: Progressing  Goal: Remains free of injury related to seizures activity  Description  INTERVENTIONS  - Maintain airway, patient safety  and administer oxygen as ordered  - Monitor patient for seizure activity, document and report duration and description of seizure to physician/advanced practitioner  - If seizure occurs,  ensure patient safety during seizure  - Reorient patient post seizure  - Seizure pads on all 4 side rails  - Instruct patient/family to notify RN of any seizure activity including if an aura is experienced  - Instruct patient/family to call for assistance with activity based on nursing assessment  - Administer anti-seizure medications if ordered    Outcome: Progressing  Goal: Achieves maximal functionality and self care  Description  INTERVENTIONS  - Monitor swallowing and airway patency with patient fatigue and changes in neurological status  - Encourage and assist patient to increase activity and self care     - Encourage visually impaired, hearing impaired and aphasic patients to use assistive/communication devices  Outcome: Progressing     Problem: CARDIOVASCULAR - ADULT  Goal: Maintains optimal cardiac output and hemodynamic stability  Description  INTERVENTIONS:  - Monitor I/O, vital signs and rhythm  - Monitor for S/S and trends of decreased cardiac output  - Administer and titrate ordered vasoactive medications to optimize hemodynamic stability  - Assess quality of pulses, skin color and temperature  - Assess for signs of decreased coronary artery perfusion  - Instruct patient to report change in severity of symptoms  Outcome: Progressing  Goal: Absence of cardiac dysrhythmias or at baseline rhythm  Description  INTERVENTIONS:  - Continuous cardiac monitoring, vital signs, obtain 12 lead EKG if ordered  - Administer antiarrhythmic and heart rate control medications as ordered  - Monitor electrolytes and administer replacement therapy as ordered  Outcome: Progressing     Problem: GASTROINTESTINAL - ADULT  Goal: Minimal or absence of nausea and/or vomiting  Description  INTERVENTIONS:  - Administer IV fluids if ordered to ensure adequate hydration  - Maintain NPO status until nausea and vomiting are resolved  - Nasogastric tube if ordered  - Administer ordered antiemetic medications as needed  - Provide nonpharmacologic comfort measures as appropriate  - Advance diet as tolerated, if ordered  - Consider nutrition services referral to assist patient with adequate nutrition and appropriate food choices  Outcome: Progressing  Goal: Maintains or returns to baseline bowel function  Description  INTERVENTIONS:  - Assess bowel function  - Encourage oral fluids to ensure adequate hydration  - Administer IV fluids if ordered to ensure adequate hydration  - Administer ordered medications as needed  - Encourage mobilization and activity  - Consider nutritional services referral to assist patient with adequate nutrition and appropriate food choices  Outcome: Progressing  Goal: Maintains adequate nutritional intake  Description  INTERVENTIONS:  - Monitor percentage of each meal consumed  - Identify factors contributing to decreased intake, treat as appropriate  - Assist with meals as needed  - Monitor I&O, weight, and lab values if indicated  - Obtain nutrition services referral as needed  Outcome: Progressing  Goal: Establish and maintain optimal ostomy function  Description  INTERVENTIONS:  - Assess bowel function  - Encourage oral fluids to ensure adequate hydration  - Administer IV fluids if ordered to ensure adequate hydration   - Administer ordered medications as needed  - Encourage mobilization and activity  - Nutrition services referral to assist patient with appropriate food choices  - Assess stoma site  - Consider wound care consult   Outcome: Progressing     Problem: METABOLIC, FLUID AND ELECTROLYTES - ADULT  Goal: Electrolytes maintained within normal limits  Description  INTERVENTIONS:  - Monitor labs and assess patient for signs and symptoms of electrolyte imbalances  - Administer electrolyte replacement as ordered  - Monitor response to electrolyte replacements, including repeat lab results as appropriate  - Instruct patient on fluid and nutrition as appropriate  Outcome: Progressing  Goal: Fluid balance maintained  Description  INTERVENTIONS:  - Monitor labs   - Monitor I/O and WT  - Instruct patient on fluid and nutrition as appropriate  - Assess for signs & symptoms of volume excess or deficit  Outcome: Progressing  Goal: Glucose maintained within target range  Description  INTERVENTIONS:  - Monitor Blood Glucose as ordered  - Assess for signs and symptoms of hyperglycemia and hypoglycemia  - Administer ordered medications to maintain glucose within target range  - Assess nutritional intake and initiate nutrition service referral as needed  Outcome: Progressing

## 2019-10-31 NOTE — ASSESSMENT & PLAN NOTE
· Admitted consumption to sister prior to EMS arrival  · On admission Ketonuria, elevated lactate, respiratory alkalosis, and presumed hemorrhagic gastritis  · Poison Control/Toxicology consulted  · Cont IVF and supportive care

## 2019-10-31 NOTE — ASSESSMENT & PLAN NOTE
· Admitted consumption to sister prior to EMS arrival  · On admission Ketonuria, elevated lactate, respiratory alkalosis, and presumed hemorrhagic gastritis  · Poison Toxicology consulted  · Cont IVF and supportive care

## 2019-10-31 NOTE — ASSESSMENT & PLAN NOTE
· Pt found covered in 'coffee ground' emesis  · Known esophageal ulceration medically treated at [facility] 2016  · CT abd/pelvis - Moderate hiatal hernia and diffuse thickening of the esophagus with intraluminal fluid compatible with reflux esophagitis  · Fecal occult positive  · Currently HD stable, Hgb trend 12, 10, 8  · Several episodes of vomiting and retching but no signs of active bleeding  · Monitor H/H, keep NPO

## 2019-10-31 NOTE — PROGRESS NOTES
Progress Note - Jenny Ruiz 1955, 60 y o  female MRN: [id number]    Unit/Bed#: ICU 15 Encounter: [id number]    Primary Care Provider: No primary care provider on file  Date and time admitted to hospital: 10/29/2019  7:20 PM        Isopropyl alcohol poisoning  Assessment & Plan  · Admitted consumption to sister prior to EMS arrival  · On admission Ketonuria, elevated lactate, respiratory alkalosis, and presumed hemorrhagic gastritis  · Poison Control/Toxicology consulted  · Cont IVF and supportive care    Acute alcoholic gastritis with hemorrhage  Assessment & Plan  · Pt found covered in 'coffee ground' emesis  · Known esophageal ulceration medically treated at [facility] 2016  · CT abd/pelvis - Moderate hiatal hernia and diffuse thickening of the esophagus with intraluminal fluid compatible with reflux esophagitis  · Fecal occult positive  · Currently HD stable, Hgb 10 from 12  · Several episodes of vomiting and retching but no concern for bleed or coffee ground consistency  · Monitor H/H, keep NPO    * Toxic metabolic encephalopathy  Assessment & Plan  · Likely isopropyl and ETOH withdrawl  · Coma panel negative, UDS negative, ammonia level normal, CT head negative  · Trend Neuro exam  · Continue CIWA protocol    Leukocytosis  Assessment & Plan  · Likely SIRS response to isopropyl ingestion  · WBC improving, cont to trend daily    Chronic alcoholism (HCC)  Assessment & Plan  · CIWA protocol  · Thiamine and Folate    Hypokalemia  Assessment & Plan  · Monitor BMP, Mg, and Phos replete as needed      ----------------------------------------------------------------------------------------  HPI/24hr events: Pt now awake and interactive, encephalopathy improving  Vital signs remain stable       Disposition: Continue Stepdown Level 1 level of care   Code Status: Level 1 - Full Code  ---------------------------------------------------------------------------------------  SUBJECTIVE  Pt's sister at bedside discussing recent events, pt became tearful stating "I'm a terrible mother "    Review of Systems   HENT:        Dry mouth and thirty   Gastrointestinal: Positive for abdominal pain  All other systems reviewed and are negative  Review of systems was reviewed and negative unless stated above in HPI/24-hour events   ---------------------------------------------------------------------------------------  OBJECTIVE    Physical Exam   Constitutional: She is oriented to person, place, and time  She appears well-developed and well-nourished  HENT:   Head: Normocephalic and atraumatic  Dry mucous membranes   Eyes: Pupils are equal, round, and reactive to light  Neck: Normal range of motion  Neck supple  No JVD present  No tracheal deviation present  Cardiovascular: Normal rate, regular rhythm, normal heart sounds and intact distal pulses  Pulmonary/Chest: Effort normal and breath sounds normal  No respiratory distress  Abdominal: Soft  Bowel sounds are normal    Musculoskeletal: Normal range of motion  She exhibits no edema  Neurological: She is alert and oriented to person, place, and time  Skin: Skin is warm and dry  Capillary refill takes less than 2 seconds  Scratch marks present on left anterior thigh   Psychiatric: Her affect is labile  She exhibits a depressed mood  Vitals reviewed        Vitals   Vitals:    10/30/19 1943 10/30/19 1956 10/30/19 2100 10/30/19 2300   BP: 117/50  (!) 106/40 (!) 111/47   BP Location:       Pulse:  100 86 86   Resp:  (!) 9 16 22   Temp:    (!) 97 3 °F (36 3 °C)   TempSrc:    Temporal   SpO2:  97% 93% 93%   Weight:         Temp (24hrs), Av 4 °F (36 9 °C), Min:97 3 °F (36 3 °C), Max:99 1 °F (37 3 °C)  Current: Temperature: (!) 97 3 °F (36 3 °C)      Invasive/non-invasive ventilation settings   Respiratory    Lab Data (Last 4 hours)    None         O2/Vent Data (Last 4 hours)    None                Height and Weights      IBW: -92 5 kg  There is no height or weight on file to calculate BMI  Weight (last 2 days)     Date/Time   Weight    10/30/19 0546   83 9 (184 97)    10/30/19 0046   83 9 (184 97)    10/29/19 1917   89 3 (196 87)                Intake and Output  I/O       10/29 0701 - 10/30 0700 10/30 0701 - 10/31 0700    I V  (mL/kg) 1025 4 (12 2) 2700 (32 2)    IV Piggyback 2326 7 300    Total Intake(mL/kg) 3352 1 (40) 3000 (35 8)    Urine (mL/kg/hr) 700 400 (0 2)    Stool 0     Total Output 700 400    Net +2652 1 +2600          Unmeasured Urine Occurrence 1 x 4 x    Unmeasured Stool Occurrence 1 x           Nutrition       Diet Orders   (From admission, onward)             Start     Ordered    10/30/19 0046  Diet NPO  Diet effective now     Question Answer Comment   Diet Type NPO    RD to adjust diet per protocol?  No        10/30/19 0045                Laboratory and Diagnostics:  Results from last 7 days   Lab Units 10/30/19  1005 10/30/19  0303 10/29/19  1926   WBC Thousand/uL  --  18 13* 21 07*   HEMOGLOBIN g/dL  --  10 6* 12 8   I STAT HEMOGLOBIN g/dl 9 5*  --   --    HEMATOCRIT %  --  31 2* 37 8   HEMATOCRIT, ISTAT % 28*  --   --    PLATELETS Thousands/uL  --  232 315   NEUTROS PCT %  --  76* 83*   MONOS PCT %  --  7 6     Results from last 7 days   Lab Units 10/30/19  1005 10/30/19  0316 10/29/19  1926   SODIUM mmol/L  --  137 133*   POTASSIUM mmol/L  --  3 0* 3 2*   CHLORIDE mmol/L  --  102 94*   CO2 mmol/L  --  26 28   CO2, I-STAT mmol/L 27  --   --    AGAP mmol/L 16*  --   --    ANION GAP mmol/L  --  9 11   BUN mg/dL  --  25 36*   CREATININE mg/dL  --  2 41* 2 54*   CALCIUM mg/dL  --  7 6* 8 3   GLUCOSE RANDOM mg/dL  --  146* 177*   ALT U/L  --  29 27   AST U/L  --  53* 45   ALK PHOS U/L  --  69 87   ALBUMIN g/dL  --  3 0* 3 6   TOTAL BILIRUBIN mg/dL  --  0 32 0 35     Results from last 7 days   Lab Units 10/30/19  0316   MAGNESIUM mg/dL 2 0      Results from last 7 days   Lab Units 10/29/19  1926   INR  1 08   PTT seconds 26          Results from last 7 days   Lab Units 10/30/19  0253 10/29/19  1926   LACTIC ACID mmol/L 2 2* 2 7*     ABG:  Results from last 7 days   Lab Units 10/30/19  0755   PH ART  7 518*   PCO2 ART mm Hg 34 9*   PO2 ART mm Hg 64 4*   HCO3 ART mmol/L 27 7   BASE EXC ART mmol/L 4 8   ABG SOURCE  Radial, Right     VBG:  Results from last 7 days   Lab Units 10/30/19  0755  10/29/19  1926   PH SEGUNDO   --   --  7 615*   PCO2 SEGUNDO mm Hg  --   --  21 0*   PO2 SEGUNDO mm Hg  --   --  108 2*   HCO3 SEGUNDO mmol/L  --   --  20 9*   BASE EXC SEGUNDO mmol/L  --   --  1 5   ABG SOURCE  Radial, Right   < >  --     < > = values in this interval not displayed             Micro        EKG: NSR  Imaging: I have personally reviewed pertinent films in PACS    Active Medications  Scheduled Meds:    Current Facility-Administered Medications:  folic acid IVPB 1 mg Intravenous Daily BRANDO Heath Last Rate: Stopped (10/30/19 1231)   heparin (porcine) 5,000 Units Subcutaneous [facility] Camella Krabbe, CRNP    lactated ringers 150 mL/hr Intravenous Continuous Camella Krabbe, CRNP Last Rate: 150 mL/hr (10/30/19 2309)   LORazepam 1 mg Intravenous Q4H PRN Camella Krabbe, CRNP    metoclopramide 10 mg Intravenous Q6H PRN BRANDO Solo    ondansetron 8 mg Intravenous Q8H PRN Camella Krabbe, CRNP Last Rate: Stopped (10/30/19 0600)   pantoprazole 40 mg Intravenous Q12H [address] Isela Kenney, CRSTEFFEN    potassium chloride 40 mEq Oral Once BRANDO Haeth    thiamine 100 mg Intravenous Daily Camella Krabbe, CRNP Last Rate: Stopped (10/30/19 1232)     Continuous Infusions:    lactated ringers 150 mL/hr Last Rate: 150 mL/hr (10/30/19 2309)     PRN Meds:     LORazepam 1 mg Q4H PRN   metoclopramide 10 mg Q6H PRN   ondansetron 8 mg Q8H PRN       ---------------------------------------------------------------------------------------  Advance Directive and Living Will:      Power of :    POLST: ---------------------------------------------------------------------------------------  Counseling / Coordination of Care      BRANDO Lubin        Portions of the record may have been created with voice recognition software  Occasional wrong word or "sound a like" substitutions may have occurred due to the inherent limitations of voice recognition software    Read the chart carefully and recognize, using context, where substitutions have occurred

## 2019-10-31 NOTE — PROGRESS NOTES
Progress Note - Jose Farah 1955, 60 y o  female MRN: [id number]    Unit/Bed#: ICU 15 Encounter: [id number]    Primary Care Provider: No primary care provider on file  Date and time admitted to hospital: 10/29/2019  7:20 PM        Isopropyl alcohol poisoning  Assessment & Plan  · Admitted consumption to sister prior to EMS arrival  · On admission Ketonuria, elevated lactate, respiratory alkalosis, and presumed hemorrhagic gastritis  · Poison Toxicology consulted  · Cont IVF and supportive care    Acute alcoholic gastritis with hemorrhage  Assessment & Plan  · Pt found covered in 'coffee ground' emesis  · Known esophageal ulceration medically treated at [facility] 2016  · CT abd/pelvis - Moderate hiatal hernia and diffuse thickening of the esophagus with intraluminal fluid compatible with reflux esophagitis  · Fecal occult positive  · Currently HD stable, Hgb trend 12, 10, 8  · Several episodes of vomiting and retching but no signs of active bleeding  · Monitor H/H  · Okay with clear liquids      * Toxic metabolic encephalopathy  Assessment & Plan  · Likely isopropyl and ETOH withdrawl  · Coma panel negative, UDS negative, ammonia level normal, CT head negative  · Trend Neuro exam  · Continue CIWA protocol    Leukocytosis  Assessment & Plan  · Likely SIRS response to isopropyl ingestion  · WBC improving, cont to trend daily    Chronic alcoholism (HCC)  Assessment & Plan  · CIWA protocol  · Thiamine and Folate PO    Hypokalemia  Assessment & Plan  · Monitor BMP, Mg, and Phos replete as needed    Code Status: Level 1 - Full Code  POA:    POLST:      Reason for ICU admission:   Isopropyl alcohol ingestion     Active problems:   Principal Problem:    Toxic metabolic encephalopathy  Active Problems:    Isopropyl alcohol poisoning    Acute alcoholic gastritis with hemorrhage    Leukocytosis    Chronic alcoholism ([facility] [address] )    Hypokalemia  Resolved Problems:    * No resolved hospital problems   *      Consultants: toxicology    History of Present Illness:   60year old female with a past medical history significant for esophageal ulceration and alcohol abuse  She presented to the emergency department after being found by her sisters minimal responsive with coffee ground emesis around her and 2 bottles of wine and a bottle of isopropyl alcohol  She missed work for 1 week and nobody spoke to her so her sister went to her house to do a well visit  EMS was activated the patient was lethargic but able to arouse and admitted to drinking isopropyl alcohol to become intoxicated  Denied suicidal ideations  Poison control was called and recommended supportive care  Summary of clinical course:   She was transferred to the ICU for closer monitoring  She was noted to have a suspected MILAGRO which was false as isopropyl alcohol breaks down it forms acetone that interferes with our testing in the lab  We checked a chem 8 on istat that revealed a creatinine of 0 6 which is accurate  She was noted to have abdominal pain with episode of melena likely from gastritis  Spoke with toxicology who noted that the gastritis is self limiting and will improve with time  She was placed on Zofran and Reglan for nausea and vomiting  She was also noted to have respiratory alkalosis which is from the isopropyl alcohol that has since resolved  The patient is doing well just tearful and requesting assistance getting sober  Case management was consulted and psychiatry was consulted  She will be transferred to the medical floor  Recent or scheduled procedures:   none    Outstanding/pending diagnostics:   none    Cultures:   none       Mobilization Plan:    Activity as tolerated    Nutrition Plan:   Clear liquids    VTE Pharmacologic Prophylaxis: Heparin  VTE Mechanical Prophylaxis: sequential compression device    Discharge Plan:   Patient should be ready for discharge to home after abdominal discomfort improves and able to take [facility] medications that are not reordered and reason why:   Moody      Spoke with Dr Beckie Lang  regarding transfer  Please call [phone] with any questions or concerns  Portions of the record may have been created with voice recognition software  Occasional wrong word or "sound a like" substitutions may have occurred due to the inherent limitations of voice recognition software  Read the chart carefully and recognize, using context, where substitutions have occurred

## 2019-10-31 NOTE — ASSESSMENT & PLAN NOTE
· Pt found covered in 'coffee ground' emesis  · Known esophageal ulceration medically treated at [facility] 2016  · CT abd/pelvis - Moderate hiatal hernia and diffuse thickening of the esophagus with intraluminal fluid compatible with reflux esophagitis  · Fecal occult positive  · Currently HD stable, Hgb trend 12, 10, 8  · Several episodes of vomiting and retching but no signs of active bleeding  · Monitor H/H  · Okay with clear liquids

## 2019-11-01 LAB
ANION GAP SERPL CALCULATED.3IONS-SCNC: 4 MMOL/L (ref 4–13)
BUN SERPL-MCNC: 6 MG/DL (ref 5–25)
CALCIUM SERPL-MCNC: 8.3 MG/DL (ref 8.3–10.1)
CHLORIDE SERPL-SCNC: 106 MMOL/L (ref 100–108)
CO2 SERPL-SCNC: 30 MMOL/L (ref 21–32)
CREAT SERPL-MCNC: 1.07 MG/DL (ref 0.6–1.3)
GFR SERPL CREATININE-BSD FRML MDRD: 56 ML/MIN/1.73SQ M
GLUCOSE SERPL-MCNC: 105 MG/DL (ref 65–140)
POTASSIUM SERPL-SCNC: 3.3 MMOL/L (ref 3.5–5.3)
SODIUM SERPL-SCNC: 140 MMOL/L (ref 136–145)

## 2019-11-01 PROCEDURE — 99232 SBSQ HOSP IP/OBS MODERATE 35: CPT | Performed by: INTERNAL MEDICINE

## 2019-11-01 PROCEDURE — C9113 INJ PANTOPRAZOLE SODIUM, VIA: HCPCS | Performed by: NURSE PRACTITIONER

## 2019-11-01 PROCEDURE — 80048 BASIC METABOLIC PNL TOTAL CA: CPT | Performed by: NURSE PRACTITIONER

## 2019-11-01 RX ORDER — POTASSIUM CHLORIDE 20MEQ/15ML
20 LIQUID (ML) ORAL ONCE
Status: COMPLETED | OUTPATIENT
Start: 2019-11-01 | End: 2019-11-01

## 2019-11-01 RX ORDER — POTASSIUM CHLORIDE 20 MEQ/1
40 TABLET, EXTENDED RELEASE ORAL ONCE
Status: COMPLETED | OUTPATIENT
Start: 2019-11-01 | End: 2019-11-01

## 2019-11-01 RX ORDER — PANTOPRAZOLE SODIUM 40 MG/1
40 TABLET, DELAYED RELEASE ORAL
Status: DISCONTINUED | OUTPATIENT
Start: 2019-11-01 | End: 2019-11-02 | Stop reason: HOSPADM

## 2019-11-01 RX ADMIN — HEPARIN SODIUM 5000 UNITS: 5000 INJECTION INTRAVENOUS; SUBCUTANEOUS at 21:24

## 2019-11-01 RX ADMIN — HEPARIN SODIUM 5000 UNITS: 5000 INJECTION INTRAVENOUS; SUBCUTANEOUS at 05:30

## 2019-11-01 RX ADMIN — MELATONIN 3 MG: 3 TAB ORAL at 21:24

## 2019-11-01 RX ADMIN — HEPARIN SODIUM 5000 UNITS: 5000 INJECTION INTRAVENOUS; SUBCUTANEOUS at 15:00

## 2019-11-01 RX ADMIN — THIAMINE HCL TAB 100 MG 100 MG: 100 TAB at 08:31

## 2019-11-01 RX ADMIN — PANTOPRAZOLE SODIUM 40 MG: 40 INJECTION, POWDER, FOR SOLUTION INTRAVENOUS at 08:31

## 2019-11-01 RX ADMIN — FOLIC ACID 1 MG: 1 TABLET ORAL at 08:31

## 2019-11-01 RX ADMIN — POTASSIUM CHLORIDE 20 MEQ: 20 SOLUTION ORAL at 11:00

## 2019-11-01 RX ADMIN — POTASSIUM CHLORIDE 40 MEQ: 1500 TABLET, EXTENDED RELEASE ORAL at 11:00

## 2019-11-01 NOTE — PROGRESS NOTES
RD does not have protocol to add supplements  Recommend ordering Ensure Clear BID apple flavor to help meet kcal and PRO needs while on clear liquid diet

## 2019-11-01 NOTE — PROGRESS NOTES
Progress Note - Jose Farah 60 y o  female MRN: [id number]    Unit/Bed#: [facility] -01 Encounter: [id number]    Assessment/Plan:    Toxic metabolic encephalopathy related to alcohol ingestion now resolved    Alcohol abuse    continue thiamin and folate, no signs of withdrawal cessation counseling provided refusing inpatient rehab will consider partial program    Gastritis/hematemesis  continue PPI for acid control and avoid alcohol    Isopropyl alcohol poisoning continue supportive care    Hypokalemia    continue p o  Supplementation and monitor    Subjective:   Feels good, no complaints, denies chest pain shortness of breath nausea vomiting diarrhea no abdominal pain no fevers chills appetite is okay    Objective:     Vitals: Blood pressure 111/57, pulse 76, temperature 98 7 °F (37 1 °C), resp  rate 17, weight 88 6 kg (195 lb 5 2 oz), SpO2 97 %  ,There is no height or weight on file to calculate BMI  Results from last 7 days   Lab Units 10/31/19  1002  10/29/19  1926   WBC Thousand/uL 7 08   < > 21 07*   HEMOGLOBIN g/dL 9 1*   < > 12 8   I STAT HEMOGLOBIN   --    < >  --    HEMATOCRIT % 27 7*   < > 37 8   HEMATOCRIT, ISTAT   --    < >  --    PLATELETS Thousands/uL 129*   < > 315   INR   --   --  1 08    < > = values in this interval not displayed  Results from last 7 days   Lab Units 11/01/19  0525  10/30/19  1005 10/30/19  0316   POTASSIUM mmol/L 3 3*   < >  --  3 0*   CHLORIDE mmol/L 106   < >  --  102   CO2 mmol/L 30   < >  --  26   CO2, I-STAT mmol/L  --   --  27  --    BUN mg/dL 6   < >  --  25   CREATININE mg/dL 1 07   < >  --  2 41*   CALCIUM mg/dL 8 3   < >  --  7 6*   ALK PHOS U/L  --   --   --  69   ALT U/L  --   --   --  29   AST U/L  --   --   --  53*   GLUCOSE, ISTAT mg/dl  --   --  137  --     < > = values in this interval not displayed         Scheduled Meds:    Current Facility-Administered Medications:  bismuth subsalicylate 993 mg Oral Q6H PRN BRANDO Hutchins   folic acid 1 mg Oral Daily Anuel Pancake BRANDO Piña   heparin (porcine) 5,000 Units Subcutaneous Q8H [facility] BRANDO Scott   melatonin 3 mg Oral HS Anthony Wilkinson PA-C   pantoprazole 40 mg Oral Early Morning Peggyann [place], DO   potassium chloride 40 mEq Oral Once Peggyann [place], DO   potassium chloride 20 mEq Oral Once Peggyann [place], DO   thiamine 100 mg Oral Daily BRANDO Scott       Continuous Infusions:     Physical exam:  General appearance:  Alert no distress interaction appropriate   Head/Eyes:  Nonicteric pale PERRL EOMI  Neck:  Supple  Lungs: CTA bilateral no wheezing rhonchi or rales  Heart: normal S1 S2 regular  Abdomen: Soft nontender with bowel sounds  Extremities: no edema  Skin: no rash    Invasive Devices     Peripheral Intravenous Line            Peripheral IV 10/30/19 Right Wrist 2 days    Peripheral IV 10/31/19 Left;Ventral (anterior) Forearm 1 day                  VTE Pharmacologic Prophylaxis:  Heparin      Counseling / Coordination of Care  Total floor / unit time spent today 30  minutes  Greater than 50% of total time was spent with the patient and / or family counseling and / or coordination of care    A description of the counseling / coordination of care:

## 2019-11-01 NOTE — SOCIAL WORK
CM obtained all of the following info about the pt  HOME: Pt lives in a bi-level home; no difficulties with steps  LIVES W/: Alone  : [employer]  INDEPENDENCE: Yes  DME: No  VNA: No  I/P REHAB: No  HHC: No  MENTAL HEALTH ISSUES: Yes, alcohol abuse  D&A ISSUES: Alcohol abuse; pt was agreeable to [facility] referral  CM called [facility], spoke to William Alonzo 411 and faxed over clinicals  PCP: Brianne Osullivan, [address] [place] [address], [phone]  PHARMACY: [facility], which is how her [facility] Mix is processed for meds   INCOME SOURCE: Part-time employment at [employer]: daughters  TRANSPORTATION AT D/C: daughters or sisters     CM reviewed d/c planning process including the following: identifying help at home, patient preference for d/c planning needs, [facility] Meds to Bed program, availability of treatment team to discuss questions or concerns patient and/or family may have regarding understanding medications and recognizing signs and symptoms once discharged  CM also encouraged patient to follow up with all recommended appointments after discharge  Patient advised of importance for patient and family to participate in managing patients medical well being  Patient/caregiver received discharge checklist  Content reviewed  Patient/caregiver encouraged to participate in discharge plan of care prior to discharge home      MICKI Meyers  11/1/2019  [id number]

## 2019-11-01 NOTE — PLAN OF CARE
Problem: Potential for Falls  Goal: Patient will remain free of falls  Description  INTERVENTIONS:  - Assess patient frequently for physical needs  -  Identify cognitive and physical deficits and behaviors that affect risk of falls    -  [place] fall precautions as indicated by assessment   - Educate patient/family on patient safety including physical limitations  - Instruct patient to call for assistance with activity based on assessment  - Modify environment to reduce risk of injury  - Consider OT/PT consult to assist with strengthening/mobility  Outcome: Progressing     Problem: Prexisting or High Potential for Compromised Skin Integrity  Goal: Skin integrity is maintained or improved  Description  INTERVENTIONS:  - Identify patients at risk for skin breakdown  - Assess and monitor skin integrity  - Assess and monitor nutrition and hydration status  - Monitor labs   - Assess for incontinence   - Turn and reposition patient  - Assist with mobility/ambulation  - Relieve pressure over bony prominences  - Avoid friction and shearing  - Provide appropriate hygiene as needed including keeping skin clean and dry  - Evaluate need for skin moisturizer/barrier cream  - Collaborate with interdisciplinary team   - Patient/family teaching  - Consider wound care consult   Outcome: Progressing     Problem: PAIN - ADULT  Goal: Verbalizes/displays adequate comfort level or baseline comfort level  Description  Interventions:  - Encourage patient to monitor pain and request assistance  - Assess pain using appropriate pain scale  - Administer analgesics based on type and severity of pain and evaluate response  - Implement non-pharmacological measures as appropriate and evaluate response  - Consider cultural and social influences on pain and pain management  - Notify physician/advanced practitioner if interventions unsuccessful or patient reports new pain  Outcome: Progressing     Problem: SAFETY ADULT  Goal: Maintain or return to baseline ADL function  Description  INTERVENTIONS:  -  Assess patient's ability to carry out ADLs; assess patient's baseline for ADL function and identify physical deficits which impact ability to perform ADLs (bathing, care of mouth/teeth, toileting, grooming, dressing, etc )  - Assess/evaluate cause of self-care deficits   - Assess range of motion  - Assess patient's mobility; develop plan if impaired  - Assess patient's need for assistive devices and provide as appropriate  - Encourage maximum independence but intervene and supervise when necessary  - Involve family in performance of ADLs  - Assess for home care needs following discharge   - Consider OT consult to assist with ADL evaluation and planning for discharge  - Provide patient education as appropriate  Outcome: Progressing  Goal: Maintain or return mobility status to optimal level  Description  INTERVENTIONS:  - Assess patient's baseline mobility status (ambulation, transfers, stairs, etc )    - Identify cognitive and physical deficits and behaviors that affect mobility  - Identify mobility aids required to assist with transfers and/or ambulation (gait belt, sit-to-stand, lift, walker, cane, etc )  - [place] fall precautions as indicated by assessment  - Record patient progress and toleration of activity level on Mobility SBAR; progress patient to next Phase/Stage  - Instruct patient to call for assistance with activity based on assessment  - Consider rehabilitation consult to assist with strengthening/weightbearing, etc   Outcome: Progressing     Problem: DISCHARGE PLANNING  Goal: Discharge to home or other facility with appropriate resources  Description  INTERVENTIONS:  - Identify barriers to discharge w/patient and caregiver  - Arrange for needed discharge resources and transportation as appropriate  - Identify discharge learning needs (meds, wound care, etc )  - Arrange for interpretive services to assist at discharge as needed  - Refer to Case Management Department for coordinating discharge planning if the patient needs post-hospital services based on physician/advanced practitioner order or complex needs related to functional status, cognitive ability, or social support system  Outcome: Progressing     Problem: Knowledge Deficit  Goal: Patient/family/caregiver demonstrates understanding of disease process, treatment plan, medications, and discharge instructions  Description  Complete learning assessment and assess knowledge base    Interventions:  - Provide teaching at level of understanding  - Provide teaching via preferred learning methods  Outcome: Progressing     Problem: NEUROSENSORY - ADULT  Goal: Achieves stable or improved neurological status  Description  INTERVENTIONS  - Monitor and report changes in neurological status  - Monitor vital signs such as temperature, blood pressure, glucose, and any other labs ordered   - Initiate measures to prevent increased intracranial pressure  - Monitor for seizure activity and implement precautions if appropriate      Outcome: Progressing  Goal: Remains free of injury related to seizures activity  Description  INTERVENTIONS  - Maintain airway, patient safety  and administer oxygen as ordered  - Monitor patient for seizure activity, document and report duration and description of seizure to physician/advanced practitioner  - If seizure occurs,  ensure patient safety during seizure  - Reorient patient post seizure  - Seizure pads on all 4 side rails  - Instruct patient/family to notify RN of any seizure activity including if an aura is experienced  - Instruct patient/family to call for assistance with activity based on nursing assessment  - Administer anti-seizure medications if ordered    Outcome: Progressing  Goal: Achieves maximal functionality and self care  Description  INTERVENTIONS  - Monitor swallowing and airway patency with patient fatigue and changes in neurological status  - Encourage and assist patient to increase activity and self care     - Encourage visually impaired, hearing impaired and aphasic patients to use assistive/communication devices  Outcome: Progressing     Problem: CARDIOVASCULAR - ADULT  Goal: Maintains optimal cardiac output and hemodynamic stability  Description  INTERVENTIONS:  - Monitor I/O, vital signs and rhythm  - Monitor for S/S and trends of decreased cardiac output  - Administer and titrate ordered vasoactive medications to optimize hemodynamic stability  - Assess quality of pulses, skin color and temperature  - Assess for signs of decreased coronary artery perfusion  - Instruct patient to report change in severity of symptoms  Outcome: Progressing  Goal: Absence of cardiac dysrhythmias or at baseline rhythm  Description  INTERVENTIONS:  - Continuous cardiac monitoring, vital signs, obtain 12 lead EKG if ordered  - Administer antiarrhythmic and heart rate control medications as ordered  - Monitor electrolytes and administer replacement therapy as ordered  Outcome: Progressing     Problem: GASTROINTESTINAL - ADULT  Goal: Minimal or absence of nausea and/or vomiting  Description  INTERVENTIONS:  - Administer IV fluids if ordered to ensure adequate hydration  - Maintain NPO status until nausea and vomiting are resolved  - Nasogastric tube if ordered  - Administer ordered antiemetic medications as needed  - Provide nonpharmacologic comfort measures as appropriate  - Advance diet as tolerated, if ordered  - Consider nutrition services referral to assist patient with adequate nutrition and appropriate food choices  Outcome: Progressing  Goal: Maintains or returns to baseline bowel function  Description  INTERVENTIONS:  - Assess bowel function  - Encourage oral fluids to ensure adequate hydration  - Administer IV fluids if ordered to ensure adequate hydration  - Administer ordered medications as needed  - Encourage mobilization and activity  - Consider nutritional services referral to assist patient with adequate nutrition and appropriate food choices  Outcome: Progressing  Goal: Maintains adequate nutritional intake  Description  INTERVENTIONS:  - Monitor percentage of each meal consumed  - Identify factors contributing to decreased intake, treat as appropriate  - Assist with meals as needed  - Monitor I&O, weight, and lab values if indicated  - Obtain nutrition services referral as needed  Outcome: Progressing  Goal: Establish and maintain optimal ostomy function  Description  INTERVENTIONS:  - Assess bowel function  - Encourage oral fluids to ensure adequate hydration  - Administer IV fluids if ordered to ensure adequate hydration   - Administer ordered medications as needed  - Encourage mobilization and activity  - Nutrition services referral to assist patient with appropriate food choices  - Assess stoma site  - Consider wound care consult   Outcome: Progressing     Problem: METABOLIC, FLUID AND ELECTROLYTES - ADULT  Goal: Electrolytes maintained within normal limits  Description  INTERVENTIONS:  - Monitor labs and assess patient for signs and symptoms of electrolyte imbalances  - Administer electrolyte replacement as ordered  - Monitor response to electrolyte replacements, including repeat lab results as appropriate  - Instruct patient on fluid and nutrition as appropriate  Outcome: Progressing  Goal: Fluid balance maintained  Description  INTERVENTIONS:  - Monitor labs   - Monitor I/O and WT  - Instruct patient on fluid and nutrition as appropriate  - Assess for signs & symptoms of volume excess or deficit  Outcome: Progressing  Goal: Glucose maintained within target range  Description  INTERVENTIONS:  - Monitor Blood Glucose as ordered  - Assess for signs and symptoms of hyperglycemia and hypoglycemia  - Administer ordered medications to maintain glucose within target range  - Assess nutritional intake and initiate nutrition service referral as needed  Outcome: Progressing

## 2019-11-02 VITALS
BODY MASS INDEX: 35.78 KG/M2 | SYSTOLIC BLOOD PRESSURE: 117 MMHG | DIASTOLIC BLOOD PRESSURE: 64 MMHG | HEART RATE: 75 BPM | WEIGHT: 194.45 LBS | HEIGHT: 62 IN | OXYGEN SATURATION: 97 % | RESPIRATION RATE: 16 BRPM | TEMPERATURE: 98.1 F

## 2019-11-02 LAB
ANION GAP SERPL CALCULATED.3IONS-SCNC: 6 MMOL/L (ref 4–13)
BASOPHILS # BLD AUTO: 0.03 THOUSANDS/ΜL (ref 0–0.1)
BASOPHILS NFR BLD AUTO: 1 % (ref 0–1)
BUN SERPL-MCNC: 13 MG/DL (ref 5–25)
CALCIUM SERPL-MCNC: 8.5 MG/DL (ref 8.3–10.1)
CHLORIDE SERPL-SCNC: 104 MMOL/L (ref 100–108)
CO2 SERPL-SCNC: 29 MMOL/L (ref 21–32)
CREAT SERPL-MCNC: 0.77 MG/DL (ref 0.6–1.3)
EOSINOPHIL # BLD AUTO: 0.19 THOUSAND/ΜL (ref 0–0.61)
EOSINOPHIL NFR BLD AUTO: 3 % (ref 0–6)
ERYTHROCYTE [DISTWIDTH] IN BLOOD BY AUTOMATED COUNT: 13.3 % (ref 11.6–15.1)
GFR SERPL CREATININE-BSD FRML MDRD: 83 ML/MIN/1.73SQ M
GLUCOSE SERPL-MCNC: 104 MG/DL (ref 65–140)
HCT VFR BLD AUTO: 30.4 % (ref 34.8–46.1)
HGB BLD-MCNC: 10.1 G/DL (ref 11.5–15.4)
IMM GRANULOCYTES # BLD AUTO: 0.12 THOUSAND/UL (ref 0–0.2)
IMM GRANULOCYTES NFR BLD AUTO: 2 % (ref 0–2)
LYMPHOCYTES # BLD AUTO: 2.3 THOUSANDS/ΜL (ref 0.6–4.47)
LYMPHOCYTES NFR BLD AUTO: 39 % (ref 14–44)
MCH RBC QN AUTO: 31.3 PG (ref 26.8–34.3)
MCHC RBC AUTO-ENTMCNC: 33.2 G/DL (ref 31.4–37.4)
MCV RBC AUTO: 94 FL (ref 82–98)
MONOCYTES # BLD AUTO: 0.39 THOUSAND/ΜL (ref 0.17–1.22)
MONOCYTES NFR BLD AUTO: 7 % (ref 4–12)
NEUTROPHILS # BLD AUTO: 2.84 THOUSANDS/ΜL (ref 1.85–7.62)
NEUTS SEG NFR BLD AUTO: 48 % (ref 43–75)
NRBC BLD AUTO-RTO: 0 /100 WBCS
PLATELET # BLD AUTO: 147 THOUSANDS/UL (ref 149–390)
PMV BLD AUTO: 10.4 FL (ref 8.9–12.7)
POTASSIUM SERPL-SCNC: 3.6 MMOL/L (ref 3.5–5.3)
RBC # BLD AUTO: 3.23 MILLION/UL (ref 3.81–5.12)
SODIUM SERPL-SCNC: 139 MMOL/L (ref 136–145)
WBC # BLD AUTO: 5.87 THOUSAND/UL (ref 4.31–10.16)

## 2019-11-02 PROCEDURE — 80048 BASIC METABOLIC PNL TOTAL CA: CPT | Performed by: INTERNAL MEDICINE

## 2019-11-02 PROCEDURE — 85025 COMPLETE CBC W/AUTO DIFF WBC: CPT | Performed by: INTERNAL MEDICINE

## 2019-11-02 PROCEDURE — 99239 HOSP IP/OBS DSCHRG MGMT >30: CPT | Performed by: INTERNAL MEDICINE

## 2019-11-02 RX ORDER — LANOLIN ALCOHOL/MO/W.PET/CERES
100 CREAM (GRAM) TOPICAL DAILY
Qty: 30 TABLET | Refills: 0 | Status: SHIPPED | OUTPATIENT
Start: 2019-11-03

## 2019-11-02 RX ORDER — PANTOPRAZOLE SODIUM 40 MG/1
40 TABLET, DELAYED RELEASE ORAL
Qty: 30 TABLET | Refills: 0 | Status: ON HOLD | OUTPATIENT
Start: 2019-11-03 | End: 2020-10-22 | Stop reason: SDUPTHER

## 2019-11-02 RX ADMIN — FOLIC ACID 1 MG: 1 TABLET ORAL at 09:36

## 2019-11-02 RX ADMIN — HEPARIN SODIUM 5000 UNITS: 5000 INJECTION INTRAVENOUS; SUBCUTANEOUS at 05:05

## 2019-11-02 RX ADMIN — THIAMINE HCL TAB 100 MG 100 MG: 100 TAB at 09:36

## 2019-11-02 RX ADMIN — PANTOPRAZOLE SODIUM 40 MG: 40 TABLET, DELAYED RELEASE ORAL at 05:05

## 2019-11-02 NOTE — DISCHARGE SUMMARY
Discharge Summary - Medical Ria Gilliland 60 y o  female MRN: [id number]    [facility] Bed: [place] [address] 2 /South 2 M* Encounter: [id number]    BRIEF OVERVIEW    Admitting Provider: Jasmeet Farris MD  Discharge Provider: Vicki Mcgovern DO  Admission Date: 10/29/2019     Discharge Date: No discharge date for patient encounter  Primary Care Physician at Discharge: No primary care provider on file  None    Primary Discharge Diagnosis  Toxic metabolic encephalopathy  Acute kidney injury    Other Problems Addressed  Patient Active Problem List    Diagnosis Date Noted    Toxic metabolic encephalopathy 26/26/3778    Acute alcoholic gastritis with hemorrhage 10/29/2019    Leukocytosis 10/29/2019    Chronic alcoholism ([facility] [address] ) 10/29/2019    Hypokalemia 10/29/2019    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       Consulting Providers   Critical care    Discharge Disposition: home    Allergies  No Known Allergies  Diet restrictions:  None  Activity restrictions:  None  Discharge Condition:  [address] [place] [address] in 1 week    [address] [place] [address]    Presenting Problem/History of Present Illness  Toxic metabolic encephalopathy  Hospital Course  62-year-old female reportedly unable to respond, with coffee-ground emesis surrounded by 2 empty wine bottles and empty bottle of isopropyl discovered by patient's sister  Acute metabolic encephalopathy patient was provided supportive care and consulted with toxicology team   Patient improved with no further sequelae  Alert and oriented with appropriate behavior prior to discharge    Alcohol intoxication both ethanol and isopropyl, patient was provided thiamin/folate, had no signs of withdrawal during admission  She was seen by the host program and refused rehab    Cessation counseling provided and agrees to discontinue usage for ever    A KI creatinine increased on admission but this was related to interaction with isopropyl and not relevant  Gastritis/reported hematemesis patient was provided PPI and hemoglobin stable 10 1  Continue PPI and avoid alcohol  Hypokalemia  this was corrected with p o  Supplementation    Discharge  Statement   I spent 35 minutes discharging the patient  This time was spent on the day of discharge  I had direct contact with the patient on the day of discharge  Additional documentation is required if more than 30 minutes were spent on discharge  Discussed outpatient follow-up, given host [facility] phone number to begin alcohol cessation program   Stress cessation of alcohol consumption  Discharge instructions/Information to patient and family:   See after visit summary for information provided to patient and family

## 2019-11-02 NOTE — PLAN OF CARE
Problem: Potential for Falls  Goal: Patient will remain free of falls  Description  INTERVENTIONS:  - Assess patient frequently for physical needs  -  Identify cognitive and physical deficits and behaviors that affect risk of falls    -  [place] fall precautions as indicated by assessment   - Educate patient/family on patient safety including physical limitations  - Instruct patient to call for assistance with activity based on assessment  - Modify environment to reduce risk of injury  - Consider OT/PT consult to assist with strengthening/mobility  Outcome: Progressing     Problem: Prexisting or High Potential for Compromised Skin Integrity  Goal: Skin integrity is maintained or improved  Description  INTERVENTIONS:  - Identify patients at risk for skin breakdown  - Assess and monitor skin integrity  - Assess and monitor nutrition and hydration status  - Monitor labs   - Assess for incontinence   - Turn and reposition patient  - Assist with mobility/ambulation  - Relieve pressure over bony prominences  - Avoid friction and shearing  - Provide appropriate hygiene as needed including keeping skin clean and dry  - Evaluate need for skin moisturizer/barrier cream  - Collaborate with interdisciplinary team   - Patient/family teaching  - Consider wound care consult   Outcome: Progressing     Problem: PAIN - ADULT  Goal: Verbalizes/displays adequate comfort level or baseline comfort level  Description  Interventions:  - Encourage patient to monitor pain and request assistance  - Assess pain using appropriate pain scale  - Administer analgesics based on type and severity of pain and evaluate response  - Implement non-pharmacological measures as appropriate and evaluate response  - Consider cultural and social influences on pain and pain management  - Notify physician/advanced practitioner if interventions unsuccessful or patient reports new pain  Outcome: Progressing     Problem: SAFETY ADULT  Goal: Maintain or return to baseline ADL function  Description  INTERVENTIONS:  -  Assess patient's ability to carry out ADLs; assess patient's baseline for ADL function and identify physical deficits which impact ability to perform ADLs (bathing, care of mouth/teeth, toileting, grooming, dressing, etc )  - Assess/evaluate cause of self-care deficits   - Assess range of motion  - Assess patient's mobility; develop plan if impaired  - Assess patient's need for assistive devices and provide as appropriate  - Encourage maximum independence but intervene and supervise when necessary  - Involve family in performance of ADLs  - Assess for home care needs following discharge   - Consider OT consult to assist with ADL evaluation and planning for discharge  - Provide patient education as appropriate  Outcome: Progressing  Goal: Maintain or return mobility status to optimal level  Description  INTERVENTIONS:  - Assess patient's baseline mobility status (ambulation, transfers, stairs, etc )    - Identify cognitive and physical deficits and behaviors that affect mobility  - Identify mobility aids required to assist with transfers and/or ambulation (gait belt, sit-to-stand, lift, walker, cane, etc )  - [place] fall precautions as indicated by assessment  - Record patient progress and toleration of activity level on Mobility SBAR; progress patient to next Phase/Stage  - Instruct patient to call for assistance with activity based on assessment  - Consider rehabilitation consult to assist with strengthening/weightbearing, etc   Outcome: Progressing     Problem: DISCHARGE PLANNING  Goal: Discharge to home or other facility with appropriate resources  Description  INTERVENTIONS:  - Identify barriers to discharge w/patient and caregiver  - Arrange for needed discharge resources and transportation as appropriate  - Identify discharge learning needs (meds, wound care, etc )  - Arrange for interpretive services to assist at discharge as needed  - Refer to Case Management Department for coordinating discharge planning if the patient needs post-hospital services based on physician/advanced practitioner order or complex needs related to functional status, cognitive ability, or social support system  Outcome: Progressing     Problem: Knowledge Deficit  Goal: Patient/family/caregiver demonstrates understanding of disease process, treatment plan, medications, and discharge instructions  Description  Complete learning assessment and assess knowledge base    Interventions:  - Provide teaching at level of understanding  - Provide teaching via preferred learning methods  Outcome: Progressing     Problem: NEUROSENSORY - ADULT  Goal: Achieves stable or improved neurological status  Description  INTERVENTIONS  - Monitor and report changes in neurological status  - Monitor vital signs such as temperature, blood pressure, glucose, and any other labs ordered   - Initiate measures to prevent increased intracranial pressure  - Monitor for seizure activity and implement precautions if appropriate      Outcome: Progressing  Goal: Remains free of injury related to seizures activity  Description  INTERVENTIONS  - Maintain airway, patient safety  and administer oxygen as ordered  - Monitor patient for seizure activity, document and report duration and description of seizure to physician/advanced practitioner  - If seizure occurs,  ensure patient safety during seizure  - Reorient patient post seizure  - Seizure pads on all 4 side rails  - Instruct patient/family to notify RN of any seizure activity including if an aura is experienced  - Instruct patient/family to call for assistance with activity based on nursing assessment  - Administer anti-seizure medications if ordered    Outcome: Progressing  Goal: Achieves maximal functionality and self care  Description  INTERVENTIONS  - Monitor swallowing and airway patency with patient fatigue and changes in neurological status  - Encourage and assist patient to increase activity and self care     - Encourage visually impaired, hearing impaired and aphasic patients to use assistive/communication devices  Outcome: Progressing     Problem: CARDIOVASCULAR - ADULT  Goal: Maintains optimal cardiac output and hemodynamic stability  Description  INTERVENTIONS:  - Monitor I/O, vital signs and rhythm  - Monitor for S/S and trends of decreased cardiac output  - Administer and titrate ordered vasoactive medications to optimize hemodynamic stability  - Assess quality of pulses, skin color and temperature  - Assess for signs of decreased coronary artery perfusion  - Instruct patient to report change in severity of symptoms  Outcome: Progressing  Goal: Absence of cardiac dysrhythmias or at baseline rhythm  Description  INTERVENTIONS:  - Continuous cardiac monitoring, vital signs, obtain 12 lead EKG if ordered  - Administer antiarrhythmic and heart rate control medications as ordered  - Monitor electrolytes and administer replacement therapy as ordered  Outcome: Progressing     Problem: GASTROINTESTINAL - ADULT  Goal: Minimal or absence of nausea and/or vomiting  Description  INTERVENTIONS:  - Administer IV fluids if ordered to ensure adequate hydration  - Maintain NPO status until nausea and vomiting are resolved  - Nasogastric tube if ordered  - Administer ordered antiemetic medications as needed  - Provide nonpharmacologic comfort measures as appropriate  - Advance diet as tolerated, if ordered  - Consider nutrition services referral to assist patient with adequate nutrition and appropriate food choices  Outcome: Progressing  Goal: Maintains or returns to baseline bowel function  Description  INTERVENTIONS:  - Assess bowel function  - Encourage oral fluids to ensure adequate hydration  - Administer IV fluids if ordered to ensure adequate hydration  - Administer ordered medications as needed  - Encourage mobilization and activity  - Consider nutritional services referral to assist patient with adequate nutrition and appropriate food choices  Outcome: Progressing  Goal: Maintains adequate nutritional intake  Description  INTERVENTIONS:  - Monitor percentage of each meal consumed  - Identify factors contributing to decreased intake, treat as appropriate  - Assist with meals as needed  - Monitor I&O, weight, and lab values if indicated  - Obtain nutrition services referral as needed  Outcome: Progressing  Goal: Establish and maintain optimal ostomy function  Description  INTERVENTIONS:  - Assess bowel function  - Encourage oral fluids to ensure adequate hydration  - Administer IV fluids if ordered to ensure adequate hydration   - Administer ordered medications as needed  - Encourage mobilization and activity  - Nutrition services referral to assist patient with appropriate food choices  - Assess stoma site  - Consider wound care consult   Outcome: Progressing     Problem: METABOLIC, FLUID AND ELECTROLYTES - ADULT  Goal: Electrolytes maintained within normal limits  Description  INTERVENTIONS:  - Monitor labs and assess patient for signs and symptoms of electrolyte imbalances  - Administer electrolyte replacement as ordered  - Monitor response to electrolyte replacements, including repeat lab results as appropriate  - Instruct patient on fluid and nutrition as appropriate  Outcome: Progressing  Goal: Fluid balance maintained  Description  INTERVENTIONS:  - Monitor labs   - Monitor I/O and WT  - Instruct patient on fluid and nutrition as appropriate  - Assess for signs & symptoms of volume excess or deficit  Outcome: Progressing  Goal: Glucose maintained within target range  Description  INTERVENTIONS:  - Monitor Blood Glucose as ordered  - Assess for signs and symptoms of hyperglycemia and hypoglycemia  - Administer ordered medications to maintain glucose within target range  - Assess nutritional intake and initiate nutrition service referral as needed  Outcome: Progressing     Problem: Nutrition/Hydration-ADULT  Goal: Nutrient/Hydration intake appropriate for improving, restoring or maintaining nutritional needs  Description  Monitor and assess patient's nutrition/hydration status for malnutrition  Collaborate with interdisciplinary team and initiate plan and interventions as ordered  Monitor patient's weight and dietary intake as ordered or per policy  Utilize nutrition screening tool and intervene as necessary  Determine patient's food preferences and provide high-protein, high-caloric foods as appropriate       INTERVENTIONS:  - Monitor oral intake, urinary output, labs, and treatment plans  - Assess nutrition and hydration status and recommend course of action  - Evaluate amount of meals eaten  - Assist patient with eating if necessary   - Allow adequate time for meals  - Recommend/ encourage appropriate diets, oral nutritional supplements, and vitamin/mineral supplements  - Order, calculate, and assess calorie counts as needed  - Recommend, monitor, and adjust tube feedings and TPN/PPN based on assessed needs  - Assess need for intravenous fluids  - Provide specific nutrition/hydration education as appropriate  - Include patient/family/caregiver in decisions related to nutrition  Outcome: Progressing

## 2019-11-02 NOTE — NURSING NOTE
Patient discharged 11/2/2019 1:30 PM  AVS and discharge instructions reviewed with patient  All questions answered  Patient to be picked up by sister to be driven home via personal vehicle with [facility]OST follow up outpatient      Cynthia Colunga RN 11/2/2019 1:30 PM

## 2019-11-02 NOTE — PLAN OF CARE
Problem: Potential for Falls  Goal: Patient will remain free of falls  Description  INTERVENTIONS:  - Assess patient frequently for physical needs  -  Identify cognitive and physical deficits and behaviors that affect risk of falls    -  [place] fall precautions as indicated by assessment   - Educate patient/family on patient safety including physical limitations  - Instruct patient to call for assistance with activity based on assessment  - Modify environment to reduce risk of injury  - Consider OT/PT consult to assist with strengthening/mobility  11/1/2019 2108 by Kevin Young RN  Outcome: Progressing  11/1/2019 2102 by Kevin Young RN  Outcome: Progressing     Problem: Prexisting or High Potential for Compromised Skin Integrity  Goal: Skin integrity is maintained or improved  Description  INTERVENTIONS:  - Identify patients at risk for skin breakdown  - Assess and monitor skin integrity  - Assess and monitor nutrition and hydration status  - Monitor labs   - Assess for incontinence   - Turn and reposition patient  - Assist with mobility/ambulation  - Relieve pressure over bony prominences  - Avoid friction and shearing  - Provide appropriate hygiene as needed including keeping skin clean and dry  - Evaluate need for skin moisturizer/barrier cream  - Collaborate with interdisciplinary team   - Patient/family teaching  - Consider wound care consult   11/1/2019 2108 by Kevin Young RN  Outcome: Progressing  11/1/2019 2102 by Kevin Young RN  Outcome: Progressing     Problem: PAIN - ADULT  Goal: Verbalizes/displays adequate comfort level or baseline comfort level  Description  Interventions:  - Encourage patient to monitor pain and request assistance  - Assess pain using appropriate pain scale  - Administer analgesics based on type and severity of pain and evaluate response  - Implement non-pharmacological measures as appropriate and evaluate response  - Consider cultural and social influences on pain and pain management  - Notify physician/advanced practitioner if interventions unsuccessful or patient reports new pain  11/1/2019 2108 by Shirley Grossman RN  Outcome: Progressing  11/1/2019 2102 by Shirley Grossman RN  Outcome: Progressing     Problem: SAFETY ADULT  Goal: Maintain or return to baseline ADL function  Description  INTERVENTIONS:  -  Assess patient's ability to carry out ADLs; assess patient's baseline for ADL function and identify physical deficits which impact ability to perform ADLs (bathing, care of mouth/teeth, toileting, grooming, dressing, etc )  - Assess/evaluate cause of self-care deficits   - Assess range of motion  - Assess patient's mobility; develop plan if impaired  - Assess patient's need for assistive devices and provide as appropriate  - Encourage maximum independence but intervene and supervise when necessary  - Involve family in performance of ADLs  - Assess for home care needs following discharge   - Consider OT consult to assist with ADL evaluation and planning for discharge  - Provide patient education as appropriate  11/1/2019 2108 by Shirley Grossman RN  Outcome: Progressing  11/1/2019 2102 by Shirley Grossman RN  Outcome: Progressing  Goal: Maintain or return mobility status to optimal level  Description  INTERVENTIONS:  - Assess patient's baseline mobility status (ambulation, transfers, stairs, etc )    - Identify cognitive and physical deficits and behaviors that affect mobility  - Identify mobility aids required to assist with transfers and/or ambulation (gait belt, sit-to-stand, lift, walker, cane, etc )  - [place] fall precautions as indicated by assessment  - Record patient progress and toleration of activity level on Mobility SBAR; progress patient to next Phase/Stage  - Instruct patient to call for assistance with activity based on assessment  - Consider rehabilitation consult to assist with strengthening/weightbearing, etc   11/1/2019 2108 by Shirley Grossman RN  Outcome: Progressing  11/1/2019 2102 by Ignacia De Guzman RN  Outcome: Progressing     Problem: DISCHARGE PLANNING  Goal: Discharge to home or other facility with appropriate resources  Description  INTERVENTIONS:  - Identify barriers to discharge w/patient and caregiver  - Arrange for needed discharge resources and transportation as appropriate  - Identify discharge learning needs (meds, wound care, etc )  - Arrange for interpretive services to assist at discharge as needed  - Refer to Case Management Department for coordinating discharge planning if the patient needs post-hospital services based on physician/advanced practitioner order or complex needs related to functional status, cognitive ability, or social support system  11/1/2019 2108 by Ignacia De Guzman RN  Outcome: Progressing  11/1/2019 2102 by Ignaica De Guzman RN  Outcome: Progressing     Problem: Knowledge Deficit  Goal: Patient/family/caregiver demonstrates understanding of disease process, treatment plan, medications, and discharge instructions  Description  Complete learning assessment and assess knowledge base    Interventions:  - Provide teaching at level of understanding  - Provide teaching via preferred learning methods  11/1/2019 2108 by Ignacia De Guzman RN  Outcome: Progressing  11/1/2019 2102 by Ignacia De Guzman RN  Outcome: Progressing     Problem: NEUROSENSORY - ADULT  Goal: Achieves stable or improved neurological status  Description  INTERVENTIONS  - Monitor and report changes in neurological status  - Monitor vital signs such as temperature, blood pressure, glucose, and any other labs ordered   - Initiate measures to prevent increased intracranial pressure  - Monitor for seizure activity and implement precautions if appropriate      11/1/2019 2108 by Ignacia De Guzman RN  Outcome: Progressing  11/1/2019 2102 by Ignacia De Guzman RN  Outcome: Progressing  Goal: Remains free of injury related to seizures activity  Description  INTERVENTIONS  - Maintain airway, patient safety  and administer oxygen as ordered  - Monitor patient for seizure activity, document and report duration and description of seizure to physician/advanced practitioner  - If seizure occurs,  ensure patient safety during seizure  - Reorient patient post seizure  - Seizure pads on all 4 side rails  - Instruct patient/family to notify RN of any seizure activity including if an aura is experienced  - Instruct patient/family to call for assistance with activity based on nursing assessment  - Administer anti-seizure medications if ordered    11/1/2019 2108 by Lito Browne RN  Outcome: Progressing  11/1/2019 2102 by Lito Browne RN  Outcome: Progressing  Goal: Achieves maximal functionality and self care  Description  INTERVENTIONS  - Monitor swallowing and airway patency with patient fatigue and changes in neurological status  - Encourage and assist patient to increase activity and self care     - Encourage visually impaired, hearing impaired and aphasic patients to use assistive/communication devices  11/1/2019 2108 by Lito Browne RN  Outcome: Progressing  11/1/2019 2102 by Lito Browne RN  Outcome: Progressing     Problem: CARDIOVASCULAR - ADULT  Goal: Maintains optimal cardiac output and hemodynamic stability  Description  INTERVENTIONS:  - Monitor I/O, vital signs and rhythm  - Monitor for S/S and trends of decreased cardiac output  - Administer and titrate ordered vasoactive medications to optimize hemodynamic stability  - Assess quality of pulses, skin color and temperature  - Assess for signs of decreased coronary artery perfusion  - Instruct patient to report change in severity of symptoms  11/1/2019 2108 by Lito Browne RN  Outcome: Progressing  11/1/2019 2102 by Lito Browne RN  Outcome: Progressing  Goal: Absence of cardiac dysrhythmias or at baseline rhythm  Description  INTERVENTIONS:  - Continuous cardiac monitoring, vital signs, obtain 12 lead EKG if ordered  - Administer antiarrhythmic and heart rate control medications as ordered  - Monitor electrolytes and administer replacement therapy as ordered  11/1/2019 2108 by Xi Gardner RN  Outcome: Progressing  11/1/2019 2102 by Xi Gardner RN  Outcome: Progressing     Problem: GASTROINTESTINAL - ADULT  Goal: Minimal or absence of nausea and/or vomiting  Description  INTERVENTIONS:  - Administer IV fluids if ordered to ensure adequate hydration  - Maintain NPO status until nausea and vomiting are resolved  - Nasogastric tube if ordered  - Administer ordered antiemetic medications as needed  - Provide nonpharmacologic comfort measures as appropriate  - Advance diet as tolerated, if ordered  - Consider nutrition services referral to assist patient with adequate nutrition and appropriate food choices  11/1/2019 2108 by Xi Gardner RN  Outcome: Progressing  11/1/2019 2102 by Xi Gardner RN  Outcome: Progressing  Goal: Maintains or returns to baseline bowel function  Description  INTERVENTIONS:  - Assess bowel function  - Encourage oral fluids to ensure adequate hydration  - Administer IV fluids if ordered to ensure adequate hydration  - Administer ordered medications as needed  - Encourage mobilization and activity  - Consider nutritional services referral to assist patient with adequate nutrition and appropriate food choices  11/1/2019 2108 by Xi Gardner RN  Outcome: Progressing  11/1/2019 2102 by Xi Gardner RN  Outcome: Progressing  Goal: Maintains adequate nutritional intake  Description  INTERVENTIONS:  - Monitor percentage of each meal consumed  - Identify factors contributing to decreased intake, treat as appropriate  - Assist with meals as needed  - Monitor I&O, weight, and lab values if indicated  - Obtain nutrition services referral as needed  11/1/2019 2108 by Xi Gardner RN  Outcome: Progressing  11/1/2019 2102 by Xi Gardner RN  Outcome: Progressing  Goal: Establish and maintain optimal ostomy function  Description  INTERVENTIONS:  - Assess bowel function  - Encourage oral fluids to ensure adequate hydration  - Administer IV fluids if ordered to ensure adequate hydration   - Administer ordered medications as needed  - Encourage mobilization and activity  - Nutrition services referral to assist patient with appropriate food choices  - Assess stoma site  - Consider wound care consult   11/1/2019 2108 by Viktoriya Gonzalez RN  Outcome: Progressing  11/1/2019 2102 by Viktoriya Gonzalez RN  Outcome: Progressing     Problem: METABOLIC, FLUID AND ELECTROLYTES - ADULT  Goal: Electrolytes maintained within normal limits  Description  INTERVENTIONS:  - Monitor labs and assess patient for signs and symptoms of electrolyte imbalances  - Administer electrolyte replacement as ordered  - Monitor response to electrolyte replacements, including repeat lab results as appropriate  - Instruct patient on fluid and nutrition as appropriate  11/1/2019 2108 by Viktoriya Gonzalez RN  Outcome: Progressing  11/1/2019 2102 by Viktoriya Gonzalez RN  Outcome: Progressing  Goal: Fluid balance maintained  Description  INTERVENTIONS:  - Monitor labs   - Monitor I/O and WT  - Instruct patient on fluid and nutrition as appropriate  - Assess for signs & symptoms of volume excess or deficit  11/1/2019 2108 by Viktoriya Gonzalez RN  Outcome: Progressing  11/1/2019 2102 by Viktoriya Gonzalez RN  Outcome: Progressing  Goal: Glucose maintained within target range  Description  INTERVENTIONS:  - Monitor Blood Glucose as ordered  - Assess for signs and symptoms of hyperglycemia and hypoglycemia  - Administer ordered medications to maintain glucose within target range  - Assess nutritional intake and initiate nutrition service referral as needed  11/1/2019 2108 by Viktoriya Gonzalez RN  Outcome: Progressing  11/1/2019 2102 by Viktoriya Gonzalez RN  Outcome: Progressing     Problem: Nutrition/Hydration-ADULT  Goal: Nutrient/Hydration intake appropriate for improving, restoring or maintaining nutritional needs  Description  Monitor and assess patient's nutrition/hydration status for malnutrition  Collaborate with interdisciplinary team and initiate plan and interventions as ordered  Monitor patient's weight and dietary intake as ordered or per policy  Utilize nutrition screening tool and intervene as necessary  Determine patient's food preferences and provide high-protein, high-caloric foods as appropriate       INTERVENTIONS:  - Monitor oral intake, urinary output, labs, and treatment plans  - Assess nutrition and hydration status and recommend course of action  - Evaluate amount of meals eaten  - Assist patient with eating if necessary   - Allow adequate time for meals  - Recommend/ encourage appropriate diets, oral nutritional supplements, and vitamin/mineral supplements  - Order, calculate, and assess calorie counts as needed  - Recommend, monitor, and adjust tube feedings and TPN/PPN based on assessed needs  - Assess need for intravenous fluids  - Provide specific nutrition/hydration education as appropriate  - Include patient/family/caregiver in decisions related to nutrition  11/1/2019 2108 by Magdalena Paniagua RN  Outcome: Progressing  11/1/2019 2102 by Madgalena Paniagua RN  Outcome: Progressing

## 2019-11-02 NOTE — SOCIAL WORK
CM called [facility] to find out if pt has been seen  [facility] reports that they had met with her yesterday and pt refused all services  CM has made physician aware

## 2019-11-02 NOTE — PLAN OF CARE
Problem: Potential for Falls  Goal: Patient will remain free of falls  Description  INTERVENTIONS:  - Assess patient frequently for physical needs  -  Identify cognitive and physical deficits and behaviors that affect risk of falls    -  [place] fall precautions as indicated by assessment   - Educate patient/family on patient safety including physical limitations  - Instruct patient to call for assistance with activity based on assessment  - Modify environment to reduce risk of injury  - Consider OT/PT consult to assist with strengthening/mobility  11/2/2019 1212 by Jailene Anton RN  Outcome: Completed  11/2/2019 1146 by Jailene Anton RN  Outcome: Progressing     Problem: Prexisting or High Potential for Compromised Skin Integrity  Goal: Skin integrity is maintained or improved  Description  INTERVENTIONS:  - Identify patients at risk for skin breakdown  - Assess and monitor skin integrity  - Assess and monitor nutrition and hydration status  - Monitor labs   - Assess for incontinence   - Turn and reposition patient  - Assist with mobility/ambulation  - Relieve pressure over bony prominences  - Avoid friction and shearing  - Provide appropriate hygiene as needed including keeping skin clean and dry  - Evaluate need for skin moisturizer/barrier cream  - Collaborate with interdisciplinary team   - Patient/family teaching  - Consider wound care consult   11/2/2019 1212 by Jailene Anton RN  Outcome: Completed  11/2/2019 1146 by Jailene Anton RN  Outcome: Progressing     Problem: PAIN - ADULT  Goal: Verbalizes/displays adequate comfort level or baseline comfort level  Description  Interventions:  - Encourage patient to monitor pain and request assistance  - Assess pain using appropriate pain scale  - Administer analgesics based on type and severity of pain and evaluate response  - Implement non-pharmacological measures as appropriate and evaluate response  - Consider cultural and social influences on pain and pain management  - Notify physician/advanced practitioner if interventions unsuccessful or patient reports new pain  11/2/2019 1212 by Casey Escalante RN  Outcome: Completed  11/2/2019 1146 by Casey Escalante RN  Outcome: Progressing     Problem: SAFETY ADULT  Goal: Maintain or return to baseline ADL function  Description  INTERVENTIONS:  -  Assess patient's ability to carry out ADLs; assess patient's baseline for ADL function and identify physical deficits which impact ability to perform ADLs (bathing, care of mouth/teeth, toileting, grooming, dressing, etc )  - Assess/evaluate cause of self-care deficits   - Assess range of motion  - Assess patient's mobility; develop plan if impaired  - Assess patient's need for assistive devices and provide as appropriate  - Encourage maximum independence but intervene and supervise when necessary  - Involve family in performance of ADLs  - Assess for home care needs following discharge   - Consider OT consult to assist with ADL evaluation and planning for discharge  - Provide patient education as appropriate  11/2/2019 1212 by Casey Escalante RN  Outcome: Completed  11/2/2019 1146 by Casey Escalante RN  Outcome: Progressing  Goal: Maintain or return mobility status to optimal level  Description  INTERVENTIONS:  - Assess patient's baseline mobility status (ambulation, transfers, stairs, etc )    - Identify cognitive and physical deficits and behaviors that affect mobility  - Identify mobility aids required to assist with transfers and/or ambulation (gait belt, sit-to-stand, lift, walker, cane, etc )  - [place] fall precautions as indicated by assessment  - Record patient progress and toleration of activity level on Mobility SBAR; progress patient to next Phase/Stage  - Instruct patient to call for assistance with activity based on assessment  - Consider rehabilitation consult to assist with strengthening/weightbearing, etc   11/2/2019 1212 by Casey Escalante RN  Outcome: Completed  11/2/2019 1146 by Casey Escalante RN  Outcome: Progressing     Problem: DISCHARGE PLANNING  Goal: Discharge to home or other facility with appropriate resources  Description  INTERVENTIONS:  - Identify barriers to discharge w/patient and caregiver  - Arrange for needed discharge resources and transportation as appropriate  - Identify discharge learning needs (meds, wound care, etc )  - Arrange for interpretive services to assist at discharge as needed  - Refer to Case Management Department for coordinating discharge planning if the patient needs post-hospital services based on physician/advanced practitioner order or complex needs related to functional status, cognitive ability, or social support system  11/2/2019 1212 by Casey Escalante RN  Outcome: Completed  11/2/2019 1146 by Casey Escalante RN  Outcome: Progressing     Problem: Knowledge Deficit  Goal: Patient/family/caregiver demonstrates understanding of disease process, treatment plan, medications, and discharge instructions  Description  Complete learning assessment and assess knowledge base    Interventions:  - Provide teaching at level of understanding  - Provide teaching via preferred learning methods  11/2/2019 1212 by Casey Escalante RN  Outcome: Completed  11/2/2019 1146 by Casey Escalante RN  Outcome: Progressing     Problem: NEUROSENSORY - ADULT  Goal: Achieves stable or improved neurological status  Description  INTERVENTIONS  - Monitor and report changes in neurological status  - Monitor vital signs such as temperature, blood pressure, glucose, and any other labs ordered   - Initiate measures to prevent increased intracranial pressure  - Monitor for seizure activity and implement precautions if appropriate      11/2/2019 1212 by Casey Escalante RN  Outcome: Completed  11/2/2019 1146 by Casey Escalante RN  Outcome: Progressing  Goal: Remains free of injury related to seizures activity  Description  INTERVENTIONS  - Maintain airway, patient safety  and administer oxygen as ordered  - Monitor patient for seizure activity, document and report duration and description of seizure to physician/advanced practitioner  - If seizure occurs,  ensure patient safety during seizure  - Reorient patient post seizure  - Seizure pads on all 4 side rails  - Instruct patient/family to notify RN of any seizure activity including if an aura is experienced  - Instruct patient/family to call for assistance with activity based on nursing assessment  - Administer anti-seizure medications if ordered    11/2/2019 1212 by Zulema Breen RN  Outcome: Completed  11/2/2019 1146 by Zulema Breen RN  Outcome: Progressing  Goal: Achieves maximal functionality and self care  Description  INTERVENTIONS  - Monitor swallowing and airway patency with patient fatigue and changes in neurological status  - Encourage and assist patient to increase activity and self care     - Encourage visually impaired, hearing impaired and aphasic patients to use assistive/communication devices  11/2/2019 1212 by Zulema Breen RN  Outcome: Completed  11/2/2019 1146 by Zulema Breen RN  Outcome: Progressing     Problem: CARDIOVASCULAR - ADULT  Goal: Maintains optimal cardiac output and hemodynamic stability  Description  INTERVENTIONS:  - Monitor I/O, vital signs and rhythm  - Monitor for S/S and trends of decreased cardiac output  - Administer and titrate ordered vasoactive medications to optimize hemodynamic stability  - Assess quality of pulses, skin color and temperature  - Assess for signs of decreased coronary artery perfusion  - Instruct patient to report change in severity of symptoms  11/2/2019 1212 by Zulema Breen RN  Outcome: Completed  11/2/2019 1146 by Zulema Breen RN  Outcome: Progressing  Goal: Absence of cardiac dysrhythmias or at baseline rhythm  Description  INTERVENTIONS:  - Continuous cardiac monitoring, vital signs, obtain 12 lead EKG if ordered  - Administer antiarrhythmic and heart rate control medications as ordered  - Monitor electrolytes and administer replacement therapy as ordered  11/2/2019 1212 by Ivan De Paz RN  Outcome: Completed  11/2/2019 1146 by Ivan De Paz RN  Outcome: Progressing     Problem: GASTROINTESTINAL - ADULT  Goal: Minimal or absence of nausea and/or vomiting  Description  INTERVENTIONS:  - Administer IV fluids if ordered to ensure adequate hydration  - Maintain NPO status until nausea and vomiting are resolved  - Nasogastric tube if ordered  - Administer ordered antiemetic medications as needed  - Provide nonpharmacologic comfort measures as appropriate  - Advance diet as tolerated, if ordered  - Consider nutrition services referral to assist patient with adequate nutrition and appropriate food choices  11/2/2019 1212 by Ivan De Paz RN  Outcome: Completed  11/2/2019 1146 by Ivan De Paz RN  Outcome: Progressing  Goal: Maintains or returns to baseline bowel function  Description  INTERVENTIONS:  - Assess bowel function  - Encourage oral fluids to ensure adequate hydration  - Administer IV fluids if ordered to ensure adequate hydration  - Administer ordered medications as needed  - Encourage mobilization and activity  - Consider nutritional services referral to assist patient with adequate nutrition and appropriate food choices  11/2/2019 1212 by Ivan De Paz RN  Outcome: Completed  11/2/2019 1146 by Ivan De Paz RN  Outcome: Progressing  Goal: Maintains adequate nutritional intake  Description  INTERVENTIONS:  - Monitor percentage of each meal consumed  - Identify factors contributing to decreased intake, treat as appropriate  - Assist with meals as needed  - Monitor I&O, weight, and lab values if indicated  - Obtain nutrition services referral as needed  11/2/2019 1212 by Ivan De Paz RN  Outcome: Completed  11/2/2019 1146 by Ivan De Paz RN  Outcome: Progressing  Goal: Establish and maintain optimal ostomy function  Description  INTERVENTIONS:  - Assess bowel function  - Encourage oral fluids to ensure adequate hydration  - Administer IV fluids if ordered to ensure adequate hydration   - Administer ordered medications as needed  - Encourage mobilization and activity  - Nutrition services referral to assist patient with appropriate food choices  - Assess stoma site  - Consider wound care consult   11/2/2019 1212 by Zulema Breen RN  Outcome: Completed  11/2/2019 1146 by Zulema Breen RN  Outcome: Progressing     Problem: METABOLIC, FLUID AND ELECTROLYTES - ADULT  Goal: Electrolytes maintained within normal limits  Description  INTERVENTIONS:  - Monitor labs and assess patient for signs and symptoms of electrolyte imbalances  - Administer electrolyte replacement as ordered  - Monitor response to electrolyte replacements, including repeat lab results as appropriate  - Instruct patient on fluid and nutrition as appropriate  11/2/2019 1212 by Zulema Breen RN  Outcome: Completed  11/2/2019 1146 by Zulema Breen RN  Outcome: Progressing  Goal: Fluid balance maintained  Description  INTERVENTIONS:  - Monitor labs   - Monitor I/O and WT  - Instruct patient on fluid and nutrition as appropriate  - Assess for signs & symptoms of volume excess or deficit  11/2/2019 1212 by Zulema Breen RN  Outcome: Completed  11/2/2019 1146 by Zulema Breen RN  Outcome: Progressing  Goal: Glucose maintained within target range  Description  INTERVENTIONS:  - Monitor Blood Glucose as ordered  - Assess for signs and symptoms of hyperglycemia and hypoglycemia  - Administer ordered medications to maintain glucose within target range  - Assess nutritional intake and initiate nutrition service referral as needed  11/2/2019 1212 by Zulema Breen RN  Outcome: Completed  11/2/2019 1146 by Zulema Breen RN  Outcome: Progressing     Problem: Nutrition/Hydration-ADULT  Goal: Nutrient/Hydration intake appropriate for improving, restoring or maintaining nutritional needs  Description  Monitor and assess patient's nutrition/hydration status for malnutrition   Collaborate with interdisciplinary team and initiate plan and interventions as ordered  Monitor patient's weight and dietary intake as ordered or per policy  Utilize nutrition screening tool and intervene as necessary  Determine patient's food preferences and provide high-protein, high-caloric foods as appropriate       INTERVENTIONS:  - Monitor oral intake, urinary output, labs, and treatment plans  - Assess nutrition and hydration status and recommend course of action  - Evaluate amount of meals eaten  - Assist patient with eating if necessary   - Allow adequate time for meals  - Recommend/ encourage appropriate diets, oral nutritional supplements, and vitamin/mineral supplements  - Order, calculate, and assess calorie counts as needed  - Recommend, monitor, and adjust tube feedings and TPN/PPN based on assessed needs  - Assess need for intravenous fluids  - Provide specific nutrition/hydration education as appropriate  - Include patient/family/caregiver in decisions related to nutrition  11/2/2019 1212 by Ivan De Paz RN  Outcome: Completed  11/2/2019 1146 by Ivan De Paz RN  Outcome: Progressing

## 2019-11-02 NOTE — PROGRESS NOTES
Progress Note - Kira Bales 60 y o  female MRN: [id number]    Unit/Bed#: [facility] 2 -01 Encounter: [id number]    Assessment/Plan:    Toxic metabolic encephalopathy  related to alcohol ingestion and now resolved    Alcohol abuse     no withdrawal signs cessation counseling provided continue thiamin and folate    Gastritis     reported hematemesis, continue PPI for acid control and avoid alcohol hemoglobin improved to 10 1 no further hematemesis    Isopropyl alcohol poisoning   continue supportive care    Hypokalemia     corrected with p o  Supplementation    A KI      present on admission creatinine 1 42, 0 77 on discharge    Subjective:   Feels fine offers no complaints denies chest pain shortness of breath nausea vomiting diarrhea no fevers chills appetite is okay      Objective:     Vitals: Blood pressure 117/64, pulse 75, temperature 98 1 °F (36 7 °C), resp  rate 16, height 5' 2" (1 575 m), weight 88 2 kg (194 lb 7 1 oz), SpO2 97 %  ,Body mass index is 35 56 kg/m²  Results from last 7 days   Lab Units 11/02/19  0558  10/29/19  1926   WBC Thousand/uL 5 87   < > 21 07*   HEMOGLOBIN g/dL 10 1*   < > 12 8   I STAT HEMOGLOBIN   --    < >  --    HEMATOCRIT % 30 4*   < > 37 8   HEMATOCRIT, ISTAT   --    < >  --    PLATELETS Thousands/uL 147*   < > 315   INR   --   --  1 08    < > = values in this interval not displayed  Results from last 7 days   Lab Units 11/02/19  0558  10/30/19  1005 10/30/19  0316   POTASSIUM mmol/L 3 6   < >  --  3 0*   CHLORIDE mmol/L 104   < >  --  102   CO2 mmol/L 29   < >  --  26   CO2, I-STAT mmol/L  --   --  27  --    BUN mg/dL 13   < >  --  25   CREATININE mg/dL 0 77   < >  --  2 41*   CALCIUM mg/dL 8 5   < >  --  7 6*   ALK PHOS U/L  --   --   --  69   ALT U/L  --   --   --  29   AST U/L  --   --   --  53*   GLUCOSE, ISTAT mg/dl  --   --  137  --     < > = values in this interval not displayed         Scheduled Meds:    Current Facility-Administered Medications:  bismuth subsalicylate 329 mg Oral Q6H PRN Kiesha Brooke Piña, ANIANP   folic acid 1 mg Oral Daily Cris K Bilofsky, CRNP   heparin (porcine) 5,000 Units Subcutaneous Q8H [address] Cris K Bilofsky, CRNP   melatonin 3 mg Oral HS Herb Degroot PA-C   pantoprazole 40 mg Oral Early Morning Aramis Brannon DO   thiamine 100 mg Oral Daily Cris K Parthaofskashlee, CRNP       Continuous Infusions:     Physical exam:  General appearance:  Alert no distress interaction appropriate   Head/Eyes:  Nonicteric PERRL EOMI  Neck:  Supple  Lungs: CTA bilateral no wheezing rhonchi or rales  Heart: normal S1 S2 regular  Abdomen: Soft nontender with bowel sounds  Extremities: no edema  Skin: no rash    Invasive Devices     Peripheral Intravenous Line            Peripheral IV 10/30/19 Right Wrist 3 days    Peripheral IV 10/31/19 Left;Ventral (anterior) Forearm 2 days                    Counseling / Coordination of Care  Total floor / unit time spent today 30  minutes  Greater than 50% of total time was spent with the patient and / or family counseling and / or coordination of care    A description of the counseling / coordination of care:

## 2019-11-04 NOTE — SOCIAL WORK
Assigned CM received a VM from genoveva Sanchez [facility] reporting that pt was met, and refused IP & OP services  Tina Samayoa stated on the VM that pt was inquiring about a counselor that would specialize with alcohol addictions and that she could call when she felt it was warranted       Heriberto Cuello, MSW  11/4/2019  1012

## 2020-08-14 ENCOUNTER — HOSPITAL ENCOUNTER (OUTPATIENT)
Facility: HOSPITAL | Age: 65
Setting detail: OBSERVATION
Discharge: HOME | End: 2020-08-15
Attending: EMERGENCY MEDICINE | Admitting: HOSPITALIST
Payer: COMMERCIAL

## 2020-08-14 DIAGNOSIS — E86.0 DEHYDRATION: ICD-10-CM

## 2020-08-14 DIAGNOSIS — K29.71 HEMORRHAGIC GASTRITIS: ICD-10-CM

## 2020-08-14 DIAGNOSIS — T51.2X1A ISOPROPYL ALCOHOL POISONING: Primary | ICD-10-CM

## 2020-08-14 LAB
ALBUMIN SERPL BCP-MCNC: 3.7 G/DL (ref 3.5–5)
ALP SERPL-CCNC: 88 U/L (ref 46–116)
ALT SERPL W P-5'-P-CCNC: 30 U/L (ref 12–78)
AMPHETAMINES SERPL QL SCN: NEGATIVE
ANION GAP SERPL CALCULATED.3IONS-SCNC: 15 MMOL/L (ref 4–13)
APAP SERPL-MCNC: <2 UG/ML (ref 10–20)
APTT PPP: 27 SECONDS (ref 23–37)
AST SERPL W P-5'-P-CCNC: 21 U/L (ref 5–45)
BACTERIA UR QL AUTO: ABNORMAL /HPF
BARBITURATES UR QL: NEGATIVE
BASE EX.OXY STD BLDV CALC-SCNC: 96.4 % (ref 60–80)
BASE EXCESS BLDV CALC-SCNC: 6.7 MMOL/L
BASOPHILS # BLD AUTO: 0.05 THOUSANDS/ΜL (ref 0–0.1)
BASOPHILS NFR BLD AUTO: 0 % (ref 0–1)
BENZODIAZ UR QL: NEGATIVE
BILIRUB SERPL-MCNC: 0.42 MG/DL (ref 0.2–1)
BILIRUB UR QL STRIP: ABNORMAL
BUN SERPL-MCNC: 20 MG/DL (ref 5–25)
CALCIUM SERPL-MCNC: 8.6 MG/DL (ref 8.3–10.1)
CHLORIDE SERPL-SCNC: 104 MMOL/L (ref 100–108)
CLARITY UR: CLEAR
CO2 SERPL-SCNC: 23 MMOL/L (ref 21–32)
COCAINE UR QL: NEGATIVE
COLOR UR: YELLOW
CREAT SERPL-MCNC: 2.07 MG/DL (ref 0.6–1.3)
EOSINOPHIL # BLD AUTO: 0 THOUSAND/ΜL (ref 0–0.61)
EOSINOPHIL NFR BLD AUTO: 0 % (ref 0–6)
ERYTHROCYTE [DISTWIDTH] IN BLOOD BY AUTOMATED COUNT: 13.4 % (ref 11.6–15.1)
ETHANOL SERPL-MCNC: <3 MG/DL (ref 0–3)
GFR SERPL CREATININE-BSD FRML MDRD: 25 ML/MIN/1.73SQ M
GLUCOSE SERPL-MCNC: 118 MG/DL (ref 65–140)
GLUCOSE UR STRIP-MCNC: NEGATIVE MG/DL
HCO3 BLDV-SCNC: 26.2 MMOL/L (ref 24–30)
HCT VFR BLD AUTO: 41.7 % (ref 34.8–46.1)
HGB BLD-MCNC: 13.8 G/DL (ref 11.5–15.4)
HGB UR QL STRIP.AUTO: ABNORMAL
IMM GRANULOCYTES # BLD AUTO: 0.1 THOUSAND/UL (ref 0–0.2)
IMM GRANULOCYTES NFR BLD AUTO: 1 % (ref 0–2)
INR PPP: 1.06 (ref 0.84–1.19)
KETONES UR STRIP-MCNC: ABNORMAL MG/DL
LEUKOCYTE ESTERASE UR QL STRIP: NEGATIVE
LYMPHOCYTES # BLD AUTO: 2.16 THOUSANDS/ΜL (ref 0.6–4.47)
LYMPHOCYTES NFR BLD AUTO: 17 % (ref 14–44)
MAGNESIUM SERPL-MCNC: 2 MG/DL (ref 1.6–2.6)
MCH RBC QN AUTO: 30.1 PG (ref 26.8–34.3)
MCHC RBC AUTO-ENTMCNC: 33.1 G/DL (ref 31.4–37.4)
MCV RBC AUTO: 91 FL (ref 82–98)
METHADONE UR QL: NEGATIVE
MONOCYTES # BLD AUTO: 0.77 THOUSAND/ΜL (ref 0.17–1.22)
MONOCYTES NFR BLD AUTO: 6 % (ref 4–12)
NEUTROPHILS # BLD AUTO: 9.68 THOUSANDS/ΜL (ref 1.85–7.62)
NEUTS SEG NFR BLD AUTO: 76 % (ref 43–75)
NITRITE UR QL STRIP: NEGATIVE
NON-SQ EPI CELLS URNS QL MICRO: ABNORMAL /HPF
NRBC BLD AUTO-RTO: 0 /100 WBCS
O2 CT BLDV-SCNC: 19.5 ML/DL
OPIATES UR QL SCN: NEGATIVE
OXYCODONE+OXYMORPHONE UR QL SCN: NEGATIVE
PCO2 BLDV: 24.6 MM HG (ref 42–50)
PCP UR QL: NEGATIVE
PH BLDV: 7.65 [PH] (ref 7.3–7.4)
PH UR STRIP.AUTO: 7 [PH] (ref 4.5–8)
PLATELET # BLD AUTO: 257 THOUSANDS/UL (ref 149–390)
PMV BLD AUTO: 10 FL (ref 8.9–12.7)
PO2 BLDV: 99.6 MM HG (ref 35–45)
POTASSIUM SERPL-SCNC: 3.2 MMOL/L (ref 3.5–5.3)
PROT SERPL-MCNC: 7.2 G/DL (ref 6.4–8.2)
PROT UR STRIP-MCNC: ABNORMAL MG/DL
PROTHROMBIN TIME: 13.6 SECONDS (ref 11.6–14.5)
RBC # BLD AUTO: 4.59 MILLION/UL (ref 3.81–5.12)
RBC #/AREA URNS AUTO: ABNORMAL /HPF
SALICYLATES SERPL-MCNC: <3 MG/DL (ref 3–20)
SODIUM SERPL-SCNC: 142 MMOL/L (ref 136–145)
SP GR UR STRIP.AUTO: 1.02 (ref 1–1.03)
THC UR QL: NEGATIVE
UROBILINOGEN UR QL STRIP.AUTO: 0.2 E.U./DL
WBC # BLD AUTO: 12.76 THOUSAND/UL (ref 4.31–10.16)
WBC #/AREA URNS AUTO: ABNORMAL /HPF

## 2020-08-14 PROCEDURE — 93005 ELECTROCARDIOGRAM TRACING: CPT

## 2020-08-14 PROCEDURE — 85730 THROMBOPLASTIN TIME PARTIAL: CPT | Performed by: EMERGENCY MEDICINE

## 2020-08-14 PROCEDURE — 85610 PROTHROMBIN TIME: CPT | Performed by: EMERGENCY MEDICINE

## 2020-08-14 PROCEDURE — 80307 DRUG TEST PRSMV CHEM ANLYZR: CPT | Performed by: EMERGENCY MEDICINE

## 2020-08-14 PROCEDURE — 99245 OFF/OP CONSLTJ NEW/EST HI 55: CPT | Performed by: EMERGENCY MEDICINE

## 2020-08-14 PROCEDURE — 96361 HYDRATE IV INFUSION ADD-ON: CPT

## 2020-08-14 PROCEDURE — 99285 EMERGENCY DEPT VISIT HI MDM: CPT

## 2020-08-14 PROCEDURE — 99220 PR INITIAL OBSERVATION CARE/DAY 70 MINUTES: CPT | Performed by: PHYSICIAN ASSISTANT

## 2020-08-14 PROCEDURE — 85025 COMPLETE CBC W/AUTO DIFF WBC: CPT | Performed by: EMERGENCY MEDICINE

## 2020-08-14 PROCEDURE — 80053 COMPREHEN METABOLIC PANEL: CPT | Performed by: EMERGENCY MEDICINE

## 2020-08-14 PROCEDURE — 99285 EMERGENCY DEPT VISIT HI MDM: CPT | Performed by: EMERGENCY MEDICINE

## 2020-08-14 PROCEDURE — 96375 TX/PRO/DX INJ NEW DRUG ADDON: CPT

## 2020-08-14 PROCEDURE — C9113 INJ PANTOPRAZOLE SODIUM, VIA: HCPCS | Performed by: PHYSICIAN ASSISTANT

## 2020-08-14 PROCEDURE — 96374 THER/PROPH/DIAG INJ IV PUSH: CPT

## 2020-08-14 PROCEDURE — 80329 ANALGESICS NON-OPIOID 1 OR 2: CPT | Performed by: EMERGENCY MEDICINE

## 2020-08-14 PROCEDURE — 81001 URINALYSIS AUTO W/SCOPE: CPT

## 2020-08-14 PROCEDURE — 80320 DRUG SCREEN QUANTALCOHOLS: CPT | Performed by: EMERGENCY MEDICINE

## 2020-08-14 PROCEDURE — 83735 ASSAY OF MAGNESIUM: CPT | Performed by: EMERGENCY MEDICINE

## 2020-08-14 PROCEDURE — C9113 INJ PANTOPRAZOLE SODIUM, VIA: HCPCS | Performed by: EMERGENCY MEDICINE

## 2020-08-14 PROCEDURE — 82805 BLOOD GASES W/O2 SATURATION: CPT | Performed by: EMERGENCY MEDICINE

## 2020-08-14 PROCEDURE — 36415 COLL VENOUS BLD VENIPUNCTURE: CPT | Performed by: EMERGENCY MEDICINE

## 2020-08-14 PROCEDURE — 93010 ELECTROCARDIOGRAM REPORT: CPT | Performed by: INTERNAL MEDICINE

## 2020-08-14 RX ORDER — ONDANSETRON 2 MG/ML
4 INJECTION INTRAMUSCULAR; INTRAVENOUS ONCE
Status: COMPLETED | OUTPATIENT
Start: 2020-08-14 | End: 2020-08-14

## 2020-08-14 RX ORDER — POTASSIUM CHLORIDE 14.9 MG/ML
20 INJECTION INTRAVENOUS ONCE
Status: COMPLETED | OUTPATIENT
Start: 2020-08-14 | End: 2020-08-14

## 2020-08-14 RX ORDER — LANOLIN ALCOHOL/MO/W.PET/CERES
6 CREAM (GRAM) TOPICAL
Status: DISCONTINUED | OUTPATIENT
Start: 2020-08-14 | End: 2020-08-15 | Stop reason: HOSPADM

## 2020-08-14 RX ORDER — ONDANSETRON 2 MG/ML
1 INJECTION INTRAMUSCULAR; INTRAVENOUS ONCE
Status: COMPLETED | OUTPATIENT
Start: 2020-08-14 | End: 2020-08-14

## 2020-08-14 RX ORDER — ONDANSETRON 2 MG/ML
4 INJECTION INTRAMUSCULAR; INTRAVENOUS EVERY 6 HOURS PRN
Status: DISCONTINUED | OUTPATIENT
Start: 2020-08-14 | End: 2020-08-15 | Stop reason: HOSPADM

## 2020-08-14 RX ORDER — THIAMINE MONONITRATE (VIT B1) 100 MG
100 TABLET ORAL DAILY
Status: DISCONTINUED | OUTPATIENT
Start: 2020-08-15 | End: 2020-08-15

## 2020-08-14 RX ORDER — ONDANSETRON 2 MG/ML
4 INJECTION INTRAMUSCULAR; INTRAVENOUS ONCE AS NEEDED
Status: DISCONTINUED | OUTPATIENT
Start: 2020-08-14 | End: 2020-08-14 | Stop reason: SDUPTHER

## 2020-08-14 RX ORDER — PANTOPRAZOLE SODIUM 40 MG/1
40 INJECTION, POWDER, FOR SOLUTION INTRAVENOUS EVERY 12 HOURS SCHEDULED
Status: DISCONTINUED | OUTPATIENT
Start: 2020-08-14 | End: 2020-08-15 | Stop reason: HOSPADM

## 2020-08-14 RX ORDER — PANTOPRAZOLE SODIUM 40 MG/1
40 INJECTION, POWDER, FOR SOLUTION INTRAVENOUS ONCE
Status: COMPLETED | OUTPATIENT
Start: 2020-08-14 | End: 2020-08-14

## 2020-08-14 RX ORDER — METOCLOPRAMIDE HYDROCHLORIDE 5 MG/ML
10 INJECTION INTRAMUSCULAR; INTRAVENOUS ONCE
Status: COMPLETED | OUTPATIENT
Start: 2020-08-14 | End: 2020-08-14

## 2020-08-14 RX ORDER — FOLIC ACID 1 MG/1
1 TABLET ORAL DAILY
Status: DISCONTINUED | OUTPATIENT
Start: 2020-08-15 | End: 2020-08-15

## 2020-08-14 RX ORDER — ACETAMINOPHEN 325 MG/1
650 TABLET ORAL EVERY 6 HOURS PRN
Status: DISCONTINUED | OUTPATIENT
Start: 2020-08-14 | End: 2020-08-15 | Stop reason: HOSPADM

## 2020-08-14 RX ORDER — SODIUM CHLORIDE 9 MG/ML
125 INJECTION, SOLUTION INTRAVENOUS CONTINUOUS
Status: DISCONTINUED | OUTPATIENT
Start: 2020-08-14 | End: 2020-08-14

## 2020-08-14 RX ORDER — CALCIUM CARBONATE 200(500)MG
1000 TABLET,CHEWABLE ORAL 3 TIMES DAILY PRN
Status: DISCONTINUED | OUTPATIENT
Start: 2020-08-14 | End: 2020-08-15 | Stop reason: HOSPADM

## 2020-08-14 RX ORDER — METOCLOPRAMIDE HYDROCHLORIDE 5 MG/ML
10 INJECTION INTRAMUSCULAR; INTRAVENOUS EVERY 6 HOURS PRN
Status: DISCONTINUED | OUTPATIENT
Start: 2020-08-14 | End: 2020-08-15 | Stop reason: HOSPADM

## 2020-08-14 RX ORDER — SODIUM CHLORIDE, SODIUM LACTATE, POTASSIUM CHLORIDE, CALCIUM CHLORIDE 600; 310; 30; 20 MG/100ML; MG/100ML; MG/100ML; MG/100ML
125 INJECTION, SOLUTION INTRAVENOUS CONTINUOUS
Status: DISCONTINUED | OUTPATIENT
Start: 2020-08-14 | End: 2020-08-15 | Stop reason: HOSPADM

## 2020-08-14 RX ADMIN — SODIUM CHLORIDE 1000 ML: 0.9 INJECTION, SOLUTION INTRAVENOUS at 11:43

## 2020-08-14 RX ADMIN — SODIUM CHLORIDE, SODIUM LACTATE, POTASSIUM CHLORIDE, AND CALCIUM CHLORIDE 125 ML/HR: .6; .31; .03; .02 INJECTION, SOLUTION INTRAVENOUS at 13:29

## 2020-08-14 RX ADMIN — METOCLOPRAMIDE 10 MG: 5 INJECTION, SOLUTION INTRAMUSCULAR; INTRAVENOUS at 21:13

## 2020-08-14 RX ADMIN — METOCLOPRAMIDE 10 MG: 5 INJECTION, SOLUTION INTRAMUSCULAR; INTRAVENOUS at 18:14

## 2020-08-14 RX ADMIN — PANTOPRAZOLE SODIUM 40 MG: 40 INJECTION, POWDER, FOR SOLUTION INTRAVENOUS at 21:14

## 2020-08-14 RX ADMIN — FOLIC ACID 1 MG: 5 INJECTION, SOLUTION INTRAMUSCULAR; INTRAVENOUS; SUBCUTANEOUS at 17:21

## 2020-08-14 RX ADMIN — POTASSIUM CHLORIDE 20 MEQ: 14.9 INJECTION, SOLUTION INTRAVENOUS at 16:14

## 2020-08-14 RX ADMIN — SODIUM CHLORIDE, SODIUM LACTATE, POTASSIUM CHLORIDE, AND CALCIUM CHLORIDE 125 ML/HR: .6; .31; .03; .02 INJECTION, SOLUTION INTRAVENOUS at 16:06

## 2020-08-14 RX ADMIN — MELATONIN 6 MG: 3 TAB ORAL at 21:14

## 2020-08-14 RX ADMIN — PANTOPRAZOLE SODIUM 40 MG: 40 INJECTION, POWDER, FOR SOLUTION INTRAVENOUS at 11:46

## 2020-08-14 RX ADMIN — SODIUM CHLORIDE, SODIUM LACTATE, POTASSIUM CHLORIDE, AND CALCIUM CHLORIDE 1000 ML: .6; .31; .03; .02 INJECTION, SOLUTION INTRAVENOUS at 13:29

## 2020-08-14 RX ADMIN — ONDANSETRON 4 MG: 2 INJECTION INTRAMUSCULAR; INTRAVENOUS at 11:46

## 2020-08-14 RX ADMIN — ONDANSETRON 4 MG: 2 INJECTION INTRAMUSCULAR; INTRAVENOUS at 15:59

## 2020-08-14 NOTE — CONSULTS
Consultation - Medical Toxicology  Ilya Chew 63 y o  female MRN: [id number]  Unit/Bed#: ED 20 Encounter: [id number]      Reason for Consult / Principal Problem: Isopropyl alcohol ingestion        Inpatient consult to Toxicology     Performed by  Kait Chen MD     Authorized by Lilly Vega MD        Inpatient consult to Toxicology  Consult performed by: Kait Chen MD  Consult ordered by: Lilly Vega MD        08/14/20      ASSESSMENT:  Isopropyl alcohol ingestion  False elevation in creatinine  Hypokalemia  Leukocytosis  Metabolic alkalosis  Nausea/vomiting  Ketonuria  Hemorrhagic gastritis    RECOMMENDATIONS:  Pt is a 62 yo F presenting with abdominal pain and nausea/ vomiting x 2 days  She admits to drinking wine and "half a bottle of rubbing alcohol" last night  She has done this in the past  Today, she appears uncomfortable due to abdominal pain but appears clinically sober  AxOx3, no slurred speech, no nystagmus, no ataxia  From a toxicological standpoint, the patient is no longer intoxicated and unlikely to have delayed sequela  The bump in her creatinine is likely due to acetone (the breakdown product of isopropyl alcohol) interfering with the creatinine assay  There is no need for specific it antidote at this time  Treatment of hemorrhagic gastritis per primary team     For further questions, please call [facility]  Service or [facility] to reach the medical  on call  Please see additional teaching note below (if available)    Isopropyl Alcohol  Isopropyl alcohol is used as an antiseptic (&quot;rubbing alcohol&quot;) and a solvent in many products  It  can cause clinical intoxication with CNS depression, similar to ethanol intoxication, although it is  three to four times more potent  In large doses, it can also cause GI tract irritation,  hypoglycemia and even hypotension   It is metabolized by alcohol dehydrogenase to acetone, a  ketone, and otherwise does not have the toxic organic acid metabolites associated with the  other toxic alcohols  On laboratory evaluation, while patients do not have the metabolic acidosis  of the other toxic alcohols, they may still have an elevated serum osmole gap  They may also  have a falsely elevated creatinine, as ketones interfere with some colorimetric creatinine  assays, including the assay here at [address] [place] [address]  [facility]  Treatment is usually supportive and there is no  antidote required  Hx and PE limited by: n/a    HPI: Trinity Angel is a 63y o  year old female who presents with abdominal pain and vomiting blood  She admits to ingesting "about half a bottle of normal sized rubbing alcohol" after finishing her bottle of wine last night  She has ingested rubbing alcohol in the past  Denies any other ingestions  Denies SI/ HI  Pt denies daily alcohol consumption but states that "when I binge, I really binge"  Was drinking because patient was feeling worried about losing her job  Currently, denies any dizziness, lightheadedness, vision changes, signs of intoxication  Review of Systems   Constitutional: Negative for diaphoresis, fatigue and fever  HENT: Negative for sneezing, tinnitus and trouble swallowing  Eyes: Negative for photophobia, pain and visual disturbance  Respiratory: Negative for cough, chest tightness and shortness of breath  Cardiovascular: Negative for chest pain and palpitations  Gastrointestinal: Positive for abdominal pain, nausea and vomiting  Negative for abdominal distention, constipation and diarrhea  Genitourinary: Negative for difficulty urinating, dysuria and flank pain  Neurological: Negative for dizziness, seizures, syncope, weakness, light-headedness, numbness and headaches  Psychiatric/Behavioral: Negative for confusion  The patient is nervous/anxious          Historical Information   Past Medical History:   Diagnosis Date    Arthritis     Disease of thyroid gland     Esophageal ulceration      History reviewed  No pertinent surgical history  Social History   Social History     Substance and Sexual Activity   Alcohol Use Yes     Social History     Substance and Sexual Activity   Drug Use Not on file     Social History     Tobacco Use   Smoking Status Unknown If Ever Smoked     History reviewed  No pertinent family history  Prior to Admission medications    Medication Sig Start Date End Date Taking? Authorizing Provider   adalimumab (HUMIRA) 20 mg/0 4 mL PSKT injection Inject 20 mg under the skin once    Historical Provider, MD   pantoprazole (PROTONIX) 40 mg tablet Take 1 tablet (40 mg total) by mouth daily in the early morning 11/3/19   Kayli Childs DO   thiamine 100 MG tablet Take 1 tablet (100 mg total) by mouth daily 11/3/19   Kayli Childs DO       Current Facility-Administered Medications   Medication Dose Route Frequency    [START ON 6/76/7808] folic acid (FOLVITE) tablet 1 mg  1 mg Oral Daily    folic acid 1 mg in sodium chloride 0 9 % 50 mL IVPB  1 mg Intravenous Once    lactated ringers infusion  125 mL/hr Intravenous Continuous    metoclopramide (REGLAN) injection 10 mg  10 mg Intravenous Q6H PRN    potassium chloride 20 mEq IVPB (premix)  20 mEq Intravenous Once    thiamine (VITAMIN B1) 100 mg in sodium chloride 0 9 % 50 mL IVPB  100 mg Intravenous Daily       No Known Allergies    Objective     No intake or output data in the 24 hours ending 08/14/20 1451    Invasive Devices:   Peripheral IV 08/14/20 Right Hand (Active)       Vitals   Vitals:    08/14/20 1111 08/14/20 1149 08/14/20 1407   BP: 134/66 134/66 135/69   TempSrc: Oral     Pulse: (!) 108 89 88   Resp: 18 22 20   Patient Position - Orthostatic VS: Lying Lying Lying   Temp: 98 6 °F (37 °C)         Physical Exam  Vitals signs reviewed  Constitutional:       General: She is not in acute distress  Appearance: Normal appearance  She is obese   She is not ill-appearing, toxic-appearing or diaphoretic  HENT:      Head: Normocephalic and atraumatic  Nose: Nose normal       Mouth/Throat:      Mouth: Mucous membranes are moist    Eyes:      Extraocular Movements: Extraocular movements intact  Pupils: Pupils are equal, round, and reactive to light  Neck:      Musculoskeletal: Normal range of motion and neck supple  Cardiovascular:      Rate and Rhythm: Normal rate and regular rhythm  Pulses: Normal pulses  Heart sounds: Normal heart sounds  Pulmonary:      Effort: Pulmonary effort is normal  No respiratory distress  Breath sounds: Normal breath sounds  Abdominal:      General: Abdomen is flat  Bowel sounds are normal       Palpations: Abdomen is soft  Tenderness: There is no abdominal tenderness  Skin:     General: Skin is warm and dry  Neurological:      General: No focal deficit present  Mental Status: She is alert and oriented to person, place, and time  Mental status is at baseline  Motor: No weakness  Coordination: Coordination normal       Deep Tendon Reflexes: Reflexes normal    Psychiatric:         Mood and Affect: Mood normal          Behavior: Behavior normal          Thought Content: Thought content normal          Judgment: Judgment normal          EKG, Pathology, and Other Studies: I have personally reviewed pertinent reports  Lab Results: I have personally reviewed pertinent reports        Labs:  Results from last 7 days   Lab Units 08/14/20  1141   WBC Thousand/uL 12 76*   HEMOGLOBIN g/dL 13 8   HEMATOCRIT % 41 7   PLATELETS Thousands/uL 257   NEUTROS PCT % 76*   LYMPHS PCT % 17   MONOS PCT % 6      Results from last 7 days   Lab Units 08/14/20  1141   SODIUM mmol/L 142   POTASSIUM mmol/L 3 2*   CHLORIDE mmol/L 104   CO2 mmol/L 23   BUN mg/dL 20   CREATININE mg/dL 2 07*   CALCIUM mg/dL 8 6   ALK PHOS U/L 88   ALT U/L 30   AST U/L 21   MAGNESIUM mg/dL 2 0      Results from last 7 days   Lab Units 08/14/20  1141 INR  1 06   PTT seconds 27         No results found for: TROPONINI  Results from last 7 days   Lab Units 08/14/20  1141   PH SEGUNDO  7 646*   PCO2 SEGUNDO mm Hg 24 6*   PO2 SEGUNDO mm Hg 99 6*   HCO3 SEGUNDO mmol/L 26 2   O2 CONTENT SEGUNDO ml/dL 19 5   O2 HGB, VENOUS % 96 4*     Results from last 7 days   Lab Units 08/14/20  1339 08/14/20  1141   ACETAMINOPHEN LVL ug/mL  --  <2*   ETHANOL LVL mg/dL <3  --    SALICYLATE LVL mg/dL  --  <3*     Invalid input(s): EXTPREGUR    Imaging Studies: I have personally reviewed pertinent reports  Counseling / Coordination of Care  Total floor / unit time spent today 45 minutes  Greater than 50% of total time was spent with the patient and / or family counseling and / or coordination of care

## 2020-08-14 NOTE — ASSESSMENT & PLAN NOTE
· Patient admitted to drinking half a bottle of rubbing alcohol last night  · On exam has ketonuria, respiratory alkalosis   · Noted MILAGRO on initial labs, likely false-positive given isopropyl alcohol ingestion  · Recheck BMP in a m    · Toxicology following

## 2020-08-14 NOTE — ASSESSMENT & PLAN NOTE
· Patient presented to ED with complaint of abdominal pain and vomiting blood  · Ingested isopropyl alcohol last night  · Toxicology consulted  · Has prior admission as in October for similar  · Supportive measures with IV fluids and antiemetics  · Monitor output  · Consider GI consult if hematemesis returns  · IV Protonix b i d   · Hemoglobin stable at 13 8, continue to trend daily  · NPO

## 2020-08-14 NOTE — ED PROVIDER NOTES
History  Chief Complaint   Patient presents with    Abdominal Pain     pt has been drinking wine, ran out of wine and drank 1/2 bottle of rubbing alcohol  reports abdominal pain and vomiting blood  History provided by:  Patient   used: No    Medical Problem - Major   Location:  Isopropyl alcohol ingestion  Severity:  Severe  Onset quality:  Sudden  Duration: She says she drank half a bottle of isopropyl alcohol last night when she ran out of wine  Progression:  Worsening  Chronicity:  Recurrent  Context:  She has had a past history of isopropyl alcohol ingestion with hemorrhagic gastritis  She admits to vomiting and can not keep anything down since last night with multiple episodes of blood in her vomitus  Relieved by:  Nothing  Worsened by:  Drinking isopropyl alcohol  Ineffective treatments:  None tried  Associated symptoms: abdominal pain, nausea and vomiting    Associated symptoms: no chest pain, no congestion, no cough, no diarrhea, no fever, no rhinorrhea and no shortness of breath        Prior to Admission Medications   Prescriptions Last Dose Informant Patient Reported? Taking? adalimumab (HUMIRA) 20 mg/0 4 mL PSKT injection  Family Member Yes No   Sig: Inject 20 mg under the skin once   pantoprazole (PROTONIX) 40 mg tablet   No No   Sig: Take 1 tablet (40 mg total) by mouth daily in the early morning   thiamine 100 MG tablet   No No   Sig: Take 1 tablet (100 mg total) by mouth daily      Facility-Administered Medications: None       Past Medical History:   Diagnosis Date    Arthritis     Disease of thyroid gland     Esophageal ulceration        History reviewed  No pertinent surgical history  History reviewed  No pertinent family history  I have reviewed and agree with the history as documented      E-Cigarette/Vaping     E-Cigarette/Vaping Substances     Social History     Tobacco Use    Smoking status: Unknown If Ever Smoked   Substance Use Topics    Alcohol use: Yes    Drug use: Not on file       Review of Systems   Constitutional: Positive for appetite change  Negative for fever  HENT: Negative for congestion and rhinorrhea  Respiratory: Negative for cough, chest tightness and shortness of breath  Cardiovascular: Negative for chest pain  Gastrointestinal: Positive for abdominal pain, nausea and vomiting  Negative for blood in stool and diarrhea  Multiple episodes of hematemesis   Genitourinary: Negative for difficulty urinating  Neurological: Positive for light-headedness  All other systems reviewed and are negative  Physical Exam  Physical Exam  Vitals signs and nursing note reviewed  Constitutional:       General: She is not in acute distress  Appearance: Normal appearance  She is well-developed  She is not ill-appearing, toxic-appearing or diaphoretic  Comments: Very tearful and anxious  HENT:      Head: Normocephalic and atraumatic  Right Ear: Hearing normal  No drainage or swelling  Left Ear: Hearing normal  No drainage or swelling  Mouth/Throat:      Mouth: Mucous membranes are dry  Comments: Dried blood around the lips in the posterior pharynx  Eyes:      General: Lids are normal          Right eye: No discharge  Left eye: No discharge  Conjunctiva/sclera: Conjunctivae normal    Neck:      Musculoskeletal: Normal range of motion  Vascular: No JVD  Trachea: Trachea normal    Cardiovascular:      Rate and Rhythm: Normal rate and regular rhythm  Pulses: Normal pulses  Heart sounds: Normal heart sounds  No murmur  No friction rub  No gallop  Pulmonary:      Effort: Pulmonary effort is normal  No respiratory distress  Breath sounds: Normal breath sounds  No stridor  No wheezing or rales  Chest:      Chest wall: No tenderness  Abdominal:      Palpations: Abdomen is soft  Tenderness: There is abdominal tenderness in the epigastric area   There is no guarding or rebound  Musculoskeletal: Normal range of motion  General: No tenderness or deformity  Lymphadenopathy:      Cervical: No cervical adenopathy  Skin:     General: Skin is warm and dry  Coloration: Skin is not pale  Findings: No rash  Neurological:      General: No focal deficit present  Mental Status: She is alert  GCS: GCS eye subscore is 4  GCS verbal subscore is 5  GCS motor subscore is 6  Cranial Nerves: No cranial nerve deficit  Sensory: No sensory deficit  Motor: No abnormal muscle tone  Psychiatric:         Mood and Affect: Mood is anxious  Speech: Speech normal          Behavior: Behavior is cooperative  Thought Content: Thought content does not include homicidal or suicidal ideation  Thought content does not include homicidal or suicidal plan  Cognition and Memory: Cognition is not impaired           Vital Signs  ED Triage Vitals [08/14/20 1111]   Temperature Pulse Respirations Blood Pressure SpO2   98 6 °F (37 °C) (!) 108 18 134/66 97 %      Temp Source Heart Rate Source Patient Position - Orthostatic VS BP Location FiO2 (%)   Oral Monitor Lying Left arm --      Pain Score       --           Vitals:    08/14/20 1111 08/14/20 1149   BP: 134/66 134/66   Pulse: (!) 108 89   Patient Position - Orthostatic VS: Lying Lying         Visual Acuity      ED Medications  Medications   lactated ringers bolus 1,000 mL (has no administration in time range)   lactated ringers infusion (has no administration in time range)   ondansetron (FOR EMS ONLY) (ZOFRAN) 4 mg/2 mL injection 4 mg (0 mg Does not apply Given to EMS 8/14/20 1109)   sodium chloride 0 9 % bolus 1,000 mL (1,000 mL Intravenous New Bag 8/14/20 1143)   ondansetron (ZOFRAN) injection 4 mg (4 mg Intravenous Given 8/14/20 1146)   pantoprazole (PROTONIX) injection 40 mg (40 mg Intravenous Given 8/14/20 1146)       Diagnostic Studies  Results Reviewed     Procedure Component Value Units Date/Time    Ethanol [[id number]] Updated:  08/14/20 1302    Lab Status:  No result Specimen:  Blood from Arm, Right     Magnesium [[id number]]  (Normal) Collected:  08/14/20 1141    Lab Status:  Final result Specimen:  Blood from Arm, Right Updated:  08/14/20 1256     Magnesium 2 0 mg/dL     Salicylate level [[id number]]  (Abnormal) Collected:  08/14/20 1141    Lab Status:  Final result Specimen:  Blood from Arm, Right Updated:  90/21/63 4295     Salicylate Lvl <3 mg/dL     Acetaminophen level-If concentration is detectable, please discuss with medical  on call   [[id number]]  (Abnormal) Collected:  08/14/20 1141    Lab Status:  Final result Specimen:  Blood from Arm, Right Updated:  08/14/20 1246     Acetaminophen Level <2 ug/mL     Urine Microscopic [[id number]]  (Abnormal) Collected:  08/14/20 1133    Lab Status:  Final result Specimen:  Urine, Clean Catch Updated:  08/14/20 1210     RBC, UA None Seen /hpf      WBC, UA 2-4 /hpf      Epithelial Cells Occasional /hpf      Bacteria, UA Occasional /hpf     Protime-INR [[id number]]  (Normal) Collected:  08/14/20 1141    Lab Status:  Final result Specimen:  Blood from Arm, Right Updated:  08/14/20 1207     Protime 13 6 seconds      INR 1 06    APTT [[id number]]  (Normal) Collected:  08/14/20 1141    Lab Status:  Final result Specimen:  Blood from Arm, Right Updated:  08/14/20 1207     PTT 27 seconds     Comprehensive metabolic panel [[id number]]  (Abnormal) Collected:  08/14/20 1141    Lab Status:  Final result Specimen:  Blood from Arm, Right Updated:  08/14/20 1203     Sodium 142 mmol/L      Potassium 3 2 mmol/L      Chloride 104 mmol/L      CO2 23 mmol/L      ANION GAP 15 mmol/L      BUN 20 mg/dL      Creatinine 2 07 mg/dL      Glucose 118 mg/dL      Calcium 8 6 mg/dL      AST 21 U/L      ALT 30 U/L      Alkaline Phosphatase 88 U/L      Total Protein 7 2 g/dL      Albumin 3 7 g/dL      Total Bilirubin 0 42 mg/dL      eGFR 25 ml/min/1 73sq m     Narrative: [facility] guidelines for Chronic Kidney Disease (CKD):     Stage 1 with normal or high GFR (GFR > 90 mL/min/1 73 square meters)    Stage 2 Mild CKD (GFR = 60-89 mL/min/1 73 square meters)    Stage 3A Moderate CKD (GFR = 45-59 mL/min/1 73 square meters)    Stage 3B Moderate CKD (GFR = 30-44 mL/min/1 73 square meters)    Stage 4 Severe CKD (GFR = 15-29 mL/min/1 73 square meters)    Stage 5 End Stage CKD (GFR <15 mL/min/1 73 square meters)  Note: GFR calculation is accurate only with a steady state creatinine    Rapid drug screen, urine [[id number]]  (Normal) Collected:  08/14/20 1132    Lab Status:  Final result Specimen:  Urine, Clean Catch Updated:  08/14/20 1202     Amph/Meth UR Negative     Barbiturate Ur Negative     Benzodiazepine Urine Negative     Cocaine Urine Negative     Methadone Urine Negative     Opiate Urine Negative     PCP Ur Negative     THC Urine Negative     Oxycodone Urine Negative    Narrative:       FOR MEDICAL PURPOSES ONLY  IF CONFIRMATION NEEDED PLEASE CONTACT THE LAB WITHIN 5 DAYS      Drug Screen Cutoff Levels:  AMPHETAMINE/METHAMPHETAMINES  1000 ng/mL  BARBITURATES     200 ng/mL  BENZODIAZEPINES     200 ng/mL  COCAINE      300 ng/mL  METHADONE      300 ng/mL  OPIATES      300 ng/mL  PHENCYCLIDINE     25 ng/mL  THC       50 ng/mL  OXYCODONE      100 ng/mL    Blood gas, venous [[id number]]  (Abnormal) Collected:  08/14/20 1141    Lab Status:  Final result Specimen:  Blood from Arm, Right Updated:  08/14/20 1158     pH, Abhishek 7 646     pCO2, Abhihsek 24 6 mm Hg      pO2, Abhishek 99 6 mm Hg      HCO3, Abhishek 26 2 mmol/L      Base Excess, Abhishek 6 7 mmol/L      O2 Content, Abhishek 19 5 ml/dL      O2 HGB, VENOUS 96 4 %     CBC and differential [[id number]]  (Abnormal) Collected:  08/14/20 1141    Lab Status:  Final result Specimen:  Blood from Arm, Right Updated:  08/14/20 1151     WBC 12 76 Thousand/uL      RBC 4 59 Million/uL      Hemoglobin 13 8 g/dL      Hematocrit 41 7 %      MCV 91 fL      MCH 30 1 pg      MCHC 33 1 g/dL      RDW 13 4 %      MPV 10 0 fL      Platelets 078 Thousands/uL      nRBC 0 /100 WBCs      Neutrophils Relative 76 %      Immat GRANS % 1 %      Lymphocytes Relative 17 %      Monocytes Relative 6 %      Eosinophils Relative 0 %      Basophils Relative 0 %      Neutrophils Absolute 9 68 Thousands/µL      Immature Grans Absolute 0 10 Thousand/uL      Lymphocytes Absolute 2 16 Thousands/µL      Monocytes Absolute 0 77 Thousand/µL      Eosinophils Absolute 0 00 Thousand/µL      Basophils Absolute 0 05 Thousands/µL     POCT urinalysis dipstick [[id number]]  (Abnormal) Resulted:  08/14/20 1136    Lab Status:  Final result Specimen:  Urine Updated:  08/14/20 1136    Urine Macroscopic, POC [[id number]]  (Abnormal) Collected:  08/14/20 1133    Lab Status:  Final result Specimen:  Urine Updated:  08/14/20 1135     Color, UA Yellow     Clarity, UA Clear     pH, UA 7 0     Leukocytes, UA Negative     Nitrite, UA Negative     Protein, UA Trace mg/dl      Glucose, UA Negative mg/dl      Ketones, UA >=160 (4+) mg/dl      Urobilinogen, UA 0 2 E U /dl      Bilirubin, UA Interference- unable to analyze     Blood, UA Moderate     Specific Gravity, UA 1 025    Narrative:       [facility] RESULT                 No orders to display              Procedures  ECG 12 Lead Documentation Only    Date/Time: 8/14/2020 12:35 PM  Performed by: Lilly Vega MD  Authorized by: Lilly Vega MD     Indications / Diagnosis:  Isopropyl alcohol ingestion  ECG reviewed by me, the ED Provider: yes    Patient location:  ED  Interpretation:     Interpretation: non-specific    Rate:     ECG rate:  88    ECG rate assessment: normal    Rhythm:     Rhythm: sinus rhythm    Ectopy:     Ectopy: none    QRS:     QRS axis:  Normal    QRS intervals:  Normal  Conduction:     Conduction: normal    ST segments:     ST segments:  Non-specific  T waves:     T waves: non-specific               ED Course MDM  Number of Diagnoses or Management Options  Dehydration:   Hemorrhagic gastritis:   Isopropyl alcohol poisoning:   Diagnosis management comments: It is appropriate all ingestion with hemorrhagic gastritis, dehydration  Creatinine may be falsely elevated due to isopropyl ingestion  Toxicology was consulted  No evidence of pancreatitis  Patient continues to be hydrated  Has not vomited here presently  She was given infusion of Protonix  She is not anemic  Her tachycardia has improved  Alkalosis may be due to prolonged vomiting  Discussed with Internal Medicine regarding admission  Amount and/or Complexity of Data Reviewed  Clinical lab tests: ordered and reviewed  Tests in the radiology section of CPT®: ordered and reviewed  Tests in the medicine section of CPT®: ordered and reviewed  Discuss the patient with other providers: yes  Independent visualization of images, tracings, or specimens: yes    Patient Progress  Patient progress: stable        Disposition  Final diagnoses:   Isopropyl alcohol poisoning   Hemorrhagic gastritis   Dehydration     Time reflects when diagnosis was documented in both MDM as applicable and the Disposition within this note     Time User Action Codes Description Comment    8/14/2020 11:38 AM Slade MELENDEZ Add [T51 2X1A] Isopropyl alcohol poisoning     8/14/2020 11:38 AM Slade Bailey Modify [T51 2X1A] Isopropyl alcohol poisoning     8/14/2020 12:49 PM Slade Bailey Add [K29 71] Hemorrhagic gastritis     8/14/2020 12:49 PM Slade Bailey Add [E86 0] Dehydration       ED Disposition     None      Follow-up Information    None         Patient's Medications   Discharge Prescriptions    No medications on file     No discharge procedures on file      PDMP Review     None          ED Provider  Electronically Signed by           Costa Omer MD  08/18/20 8679

## 2020-08-14 NOTE — H&P
[facility] Internal Medicine  H&P- Lamine Avendano 1955, 63 y o  female MRN: [id number]    Unit/Bed#: ED 20 Encounter: [id number]    Primary Care Provider: No primary care provider on file  Date and time admitted to hospital: 8/14/2020 11:05 AM        * Acute alcoholic gastritis with hemorrhage  Assessment & Plan  · Patient presented to ED with complaint of abdominal pain and vomiting blood  · Ingested isopropyl alcohol last night  · Toxicology consulted  · Has prior admission as in October for similar  · Supportive measures with IV fluids and antiemetics  · Monitor output  · Consider GI consult if hematemesis returns  · IV Protonix b i d   · Hemoglobin stable at 13 8, continue to trend daily  · NPO    Isopropyl alcohol poisoning  Assessment & Plan  · Patient admitted to drinking half a bottle of rubbing alcohol last night  · On exam has ketonuria, respiratory alkalosis   · Noted MILAGRO on initial labs, likely false-positive given isopropyl alcohol ingestion  · Recheck BMP in a m  · Toxicology following    Hypokalemia  Assessment & Plan  · Potassium 3 2 at time of admission  · Replete IV right  · Recheck BMP in a m  Chronic alcoholism ([facility] [address] )  Assessment & Plan  · Alcohol level pending on admission  · Monitor on CIWA protocol  · IV fluid hydration  · Thiamine and folic acid      VTE Prophylaxis: Pharmacologic VTE Prophylaxis contraindicated due to GI bleed  / sequential compression device   Code Status:  Full code  POLST: POLST form is not discussed and not completed at this time  Discussion with family:  Spoke with daughter Daphne Thomas over the phone    Anticipated Length of Stay:  Patient will be admitted on an Observation basis with an anticipated length of stay of  less than 2 midnights  Justification for Hospital Stay:  Isopropyl alcohol ingestion    Total Time for Visit, including Counseling / Coordination of Care: 1 hour    Greater than 50% of this total time spent on direct patient counseling and coordination of care  Chief Complaint:   Abdominal pain    History of Present Illness:    Davida Rosenthal is a 63 y o  female with past medical history of alcohol abuse, psoriatic arthritis who presents with abdominal pain  Patient notes she was drinking wine last night, ran out and began drinking half a bottle of rubbing alcohol  Patient notes she did this 1 other time last October  Patient  notes she developed severe abdominal pain and vomiting  Describes the pain as diffuse and constant  Unchanged with movement and palpation  Notes streaks of blood in her vomit  Does continue to have nausea status post Zofran in ED  Patient denies any recent fevers or chills denies any chest pain or shortness of breath  Review of Systems:    Review of Systems   Constitutional: Negative for chills and fever  HENT: Negative for trouble swallowing  Eyes: Negative for visual disturbance  Respiratory: Negative for shortness of breath  Cardiovascular: Negative for chest pain  Gastrointestinal: Positive for abdominal pain, nausea and vomiting  Genitourinary: Negative for difficulty urinating  Musculoskeletal: Negative for back pain  Skin: Negative for rash  Neurological: Negative for light-headedness  Psychiatric/Behavioral: Negative for sleep disturbance  Past Medical and Surgical History:     Past Medical History:   Diagnosis Date    Arthritis     Disease of thyroid gland     Esophageal ulceration        History reviewed  No pertinent surgical history  Meds/Allergies:    Prior to Admission medications    Medication Sig Start Date End Date Taking?  Authorizing Provider   adalimumab (HUMIRA) 20 mg/0 4 mL PSKT injection Inject 20 mg under the skin once   Yes Historical Provider, MD   Multiple Vitamins-Minerals (MULTIVITAL PO) Take by mouth daily   Yes Historical Provider, MD   pantoprazole (PROTONIX) 40 mg tablet Take 1 tablet (40 mg total) by mouth daily in the early morning 11/3/19  Yes [place] DO Nura   thiamine 100 MG tablet Take 1 tablet (100 mg total) by mouth daily 11/3/19  Yes Tavo Corado DO     I have reviewed home medications with patient personally  Allergies: No Known Allergies    Social History:     Marital Status:    Occupation:  Unknown  Patient Pre-hospital Living Situation:  Home  Patient Pre-hospital Level of Mobility:  Ambulatory  Patient Pre-hospital Diet Restrictions:  None  Substance Use History:   Social History     Substance and Sexual Activity   Alcohol Use Yes     Social History     Tobacco Use   Smoking Status Unknown If Ever Smoked     Social History     Substance and Sexual Activity   Drug Use Not on file       Family History:    History reviewed  No pertinent family history  Physical Exam:     Vitals:   Blood Pressure: 135/69 (08/14/20 1407)  Pulse: 88 (08/14/20 1407)  Temperature: 98 6 °F (37 °C) (08/14/20 1111)  Temp Source: Oral (08/14/20 1111)  Respirations: 20 (08/14/20 1407)  Weight - Scale: 87 6 kg (193 lb 2 oz) (08/14/20 1111)  SpO2: 96 % (08/14/20 1407)    Physical Exam  Vitals signs reviewed  Constitutional:       General: She is not in acute distress  HENT:      Head: Normocephalic and atraumatic  Eyes:      General: No scleral icterus  Conjunctiva/sclera: Conjunctivae normal    Neck:      Musculoskeletal: Neck supple  Cardiovascular:      Rate and Rhythm: Normal rate and regular rhythm  Heart sounds: No murmur  Pulmonary:      Effort: Pulmonary effort is normal       Breath sounds: Normal breath sounds  Abdominal:      General: Bowel sounds are normal  There is no distension  Palpations: Abdomen is soft  Tenderness: There is no abdominal tenderness  Musculoskeletal:      Right lower leg: No edema  Left lower leg: No edema  Skin:     General: Skin is warm and dry  Neurological:      Mental Status: She is alert and oriented to person, place, and time     Psychiatric:         Mood and Affect: Mood normal  Additional Data:     Lab Results: I have personally reviewed pertinent reports  Results from last 7 days   Lab Units 08/14/20  1141   WBC Thousand/uL 12 76*   HEMOGLOBIN g/dL 13 8   HEMATOCRIT % 41 7   PLATELETS Thousands/uL 257   NEUTROS PCT % 76*   LYMPHS PCT % 17   MONOS PCT % 6   EOS PCT % 0     Results from last 7 days   Lab Units 08/14/20  1141   SODIUM mmol/L 142   POTASSIUM mmol/L 3 2*   CHLORIDE mmol/L 104   CO2 mmol/L 23   BUN mg/dL 20   CREATININE mg/dL 2 07*   ANION GAP mmol/L 15*   CALCIUM mg/dL 8 6   ALBUMIN g/dL 3 7   TOTAL BILIRUBIN mg/dL 0 42   ALK PHOS U/L 88   ALT U/L 30   AST U/L 21   GLUCOSE RANDOM mg/dL 118     Results from last 7 days   Lab Units 08/14/20  1141   INR  1 06                   Imaging: I have personally reviewed pertinent reports  No orders to display       EKG, Pathology, and Other Studies Reviewed on Admission:   · EKG:  Normal sinus rhythm    Allscripts / Epic Records Reviewed: Yes     ** Please Note: This note has been constructed using a voice recognition system   **

## 2020-08-14 NOTE — PLAN OF CARE
Problem: NEUROSENSORY - ADULT  Goal: Achieves stable or improved neurological status  Description: INTERVENTIONS  - Monitor and report changes in neurological status  - Monitor vital signs such as temperature, blood pressure, glucose, and any other labs ordered   - Initiate measures to prevent increased intracranial pressure  - Monitor for seizure activity and implement precautions if appropriate      Outcome: Progressing  Goal: Remains free of injury related to seizures activity  Description: INTERVENTIONS  - Maintain airway, patient safety  and administer oxygen as ordered  - Monitor patient for seizure activity, document and report duration and description of seizure to physician/advanced practitioner  - If seizure occurs,  ensure patient safety during seizure  - Reorient patient post seizure  - Seizure pads on all 4 side rails  - Instruct patient/family to notify RN of any seizure activity including if an aura is experienced  - Instruct patient/family to call for assistance with activity based on nursing assessment  - Administer anti-seizure medications if ordered    Outcome: Progressing  Goal: Achieves maximal functionality and self care  Description: INTERVENTIONS  - Monitor swallowing and airway patency with patient fatigue and changes in neurological status  - Encourage and assist patient to increase activity and self care     - Encourage visually impaired, hearing impaired and aphasic patients to use assistive/communication devices  Outcome: Progressing     Problem: CARDIOVASCULAR - ADULT  Goal: Maintains optimal cardiac output and hemodynamic stability  Description: INTERVENTIONS:  - Monitor I/O, vital signs and rhythm  - Monitor for S/S and trends of decreased cardiac output  - Administer and titrate ordered vasoactive medications to optimize hemodynamic stability  - Assess quality of pulses, skin color and temperature  - Assess for signs of decreased coronary artery perfusion  - Instruct patient to report change in severity of symptoms  Outcome: Progressing  Goal: Absence of cardiac dysrhythmias or at baseline rhythm  Description: INTERVENTIONS:  - Continuous cardiac monitoring, vital signs, obtain 12 lead EKG if ordered  - Administer antiarrhythmic and heart rate control medications as ordered  - Monitor electrolytes and administer replacement therapy as ordered  Outcome: Progressing     Problem: RESPIRATORY - ADULT  Goal: Achieves optimal ventilation and oxygenation  Description: INTERVENTIONS:  - Assess for changes in respiratory status  - Assess for changes in mentation and behavior  - Position to facilitate oxygenation and minimize respiratory effort  - Oxygen administered by appropriate delivery if ordered  - Initiate smoking cessation education as indicated  - Encourage broncho-pulmonary hygiene including cough, deep breathe, Incentive Spirometry  - Assess the need for suctioning and aspirate as needed  - Assess and instruct to report SOB or any respiratory difficulty  - Respiratory Therapy support as indicated  Outcome: Progressing     Problem: GASTROINTESTINAL - ADULT  Goal: Minimal or absence of nausea and/or vomiting  Description: INTERVENTIONS:  - Administer IV fluids if ordered to ensure adequate hydration  - Maintain NPO status until nausea and vomiting are resolved  - Nasogastric tube if ordered  - Administer ordered antiemetic medications as needed  - Provide nonpharmacologic comfort measures as appropriate  - Advance diet as tolerated, if ordered  - Consider nutrition services referral to assist patient with adequate nutrition and appropriate food choices  Outcome: Progressing  Goal: Maintains or returns to baseline bowel function  Description: INTERVENTIONS:  - Assess bowel function  - Encourage oral fluids to ensure adequate hydration  - Administer IV fluids if ordered to ensure adequate hydration  - Administer ordered medications as needed  - Encourage mobilization and activity  - Consider nutritional services referral to assist patient with adequate nutrition and appropriate food choices  Outcome: Progressing  Goal: Maintains adequate nutritional intake  Description: INTERVENTIONS:  - Monitor percentage of each meal consumed  - Identify factors contributing to decreased intake, treat as appropriate  - Assist with meals as needed  - Monitor I&O, weight, and lab values if indicated  - Obtain nutrition services referral as needed  Outcome: Progressing     Problem: GENITOURINARY - ADULT  Goal: Maintains or returns to baseline urinary function  Description: INTERVENTIONS:  - Assess urinary function  - Encourage oral fluids to ensure adequate hydration if ordered  - Administer IV fluids as ordered to ensure adequate hydration  - Administer ordered medications as needed  - Offer frequent toileting  - Follow urinary retention protocol if ordered  Outcome: Progressing  Goal: Absence of urinary retention  Description: INTERVENTIONS:  - Assess patients ability to void and empty bladder  - Monitor I/O  - Bladder scan as needed  - Discuss with physician/AP medications to alleviate retention as needed  - Discuss catheterization for long term situations as appropriate  Outcome: Progressing     Problem: METABOLIC, FLUID AND ELECTROLYTES - ADULT  Goal: Electrolytes maintained within normal limits  Description: INTERVENTIONS:  - Monitor labs and assess patient for signs and symptoms of electrolyte imbalances  - Administer electrolyte replacement as ordered  - Monitor response to electrolyte replacements, including repeat lab results as appropriate  - Instruct patient on fluid and nutrition as appropriate  Outcome: Progressing  Goal: Fluid balance maintained  Description: INTERVENTIONS:  - Monitor labs   - Monitor I/O and WT  - Instruct patient on fluid and nutrition as appropriate  - Assess for signs & symptoms of volume excess or deficit  Outcome: Progressing     Problem: SKIN/TISSUE INTEGRITY - ADULT  Goal: Skin integrity remains intact  Description: INTERVENTIONS  - Identify patients at risk for skin breakdown  - Assess and monitor skin integrity  - Assess and monitor nutrition and hydration status  - Monitor labs (i e  albumin)  - Assess for incontinence   - Turn and reposition patient  - Assist with mobility/ambulation  - Relieve pressure over bony prominences  - Avoid friction and shearing  - Provide appropriate hygiene as needed including keeping skin clean and dry  - Evaluate need for skin moisturizer/barrier cream  - Collaborate with interdisciplinary team (i e  Nutrition, Rehabilitation, etc )   - Patient/family teaching  Outcome: Progressing  Goal: Incision(s), wounds(s) or drain site(s) healing without S/S of infection  Description: INTERVENTIONS  - Assess and document risk factors for skin impairment   - Assess and document dressing, incision, wound bed, drain sites and surrounding tissue  - Consider nutrition services referral as needed  - Oral mucous membranes remain intact  - Provide patient/ family education  Outcome: Progressing  Goal: Oral mucous membranes remain intact  Description: INTERVENTIONS  - Assess oral mucosa and hygiene practices  - Implement preventative oral hygiene regimen  - Implement oral medicated treatments as ordered  - Initiate Nutrition services referral as needed  Outcome: Progressing     Problem: HEMATOLOGIC - ADULT  Goal: Maintains hematologic stability  Description: INTERVENTIONS  - Assess for signs and symptoms of bleeding or hemorrhage  - Monitor labs  - Administer supportive blood products/factors as ordered and appropriate  Outcome: Progressing     Problem: MUSCULOSKELETAL - ADULT  Goal: Maintain or return mobility to safest level of function  Description: INTERVENTIONS:  - Assess patient's ability to carry out ADLs; assess patient's baseline for ADL function and identify physical deficits which impact ability to perform ADLs (bathing, care of mouth/teeth, toileting, grooming, dressing, etc )  - Assess/evaluate cause of self-care deficits   - Assess range of motion  - Assess patient's mobility  - Assess patient's need for assistive devices and provide as appropriate  - Encourage maximum independence but intervene and supervise when necessary  - Involve family in performance of ADLs  - Assess for home care needs following discharge   - Consider OT consult to assist with ADL evaluation and planning for discharge  - Provide patient education as appropriate  Outcome: Progressing  Goal: Maintain proper alignment of affected body part  Description: INTERVENTIONS:  - Support, maintain and protect limb and body alignment  - Provide patient/ family with appropriate education  Outcome: Progressing

## 2020-08-14 NOTE — ASSESSMENT & PLAN NOTE
· Alcohol level pending on admission  · Monitor on CIWA protocol  · IV fluid hydration  · Thiamine and folic acid

## 2020-08-15 VITALS
OXYGEN SATURATION: 96 % | SYSTOLIC BLOOD PRESSURE: 158 MMHG | HEART RATE: 62 BPM | WEIGHT: 193.12 LBS | DIASTOLIC BLOOD PRESSURE: 74 MMHG | BODY MASS INDEX: 35.32 KG/M2 | RESPIRATION RATE: 18 BRPM | TEMPERATURE: 97.8 F

## 2020-08-15 LAB
ANION GAP SERPL CALCULATED.3IONS-SCNC: 10 MMOL/L (ref 4–13)
ATRIAL RATE: 88 BPM
BASOPHILS # BLD AUTO: 0.04 THOUSANDS/ΜL (ref 0–0.1)
BASOPHILS NFR BLD AUTO: 1 % (ref 0–1)
BUN SERPL-MCNC: 10 MG/DL (ref 5–25)
CALCIUM SERPL-MCNC: 8.4 MG/DL (ref 8.3–10.1)
CHLORIDE SERPL-SCNC: 108 MMOL/L (ref 100–108)
CO2 SERPL-SCNC: 26 MMOL/L (ref 21–32)
CREAT SERPL-MCNC: 1.48 MG/DL (ref 0.6–1.3)
EOSINOPHIL # BLD AUTO: 0.04 THOUSAND/ΜL (ref 0–0.61)
EOSINOPHIL NFR BLD AUTO: 1 % (ref 0–6)
ERYTHROCYTE [DISTWIDTH] IN BLOOD BY AUTOMATED COUNT: 13.3 % (ref 11.6–15.1)
GFR SERPL CREATININE-BSD FRML MDRD: 37 ML/MIN/1.73SQ M
GLUCOSE SERPL-MCNC: 96 MG/DL (ref 65–140)
HCT VFR BLD AUTO: 38.5 % (ref 34.8–46.1)
HGB BLD-MCNC: 12.4 G/DL (ref 11.5–15.4)
IMM GRANULOCYTES # BLD AUTO: 0.03 THOUSAND/UL (ref 0–0.2)
IMM GRANULOCYTES NFR BLD AUTO: 1 % (ref 0–2)
LYMPHOCYTES # BLD AUTO: 2.1 THOUSANDS/ΜL (ref 0.6–4.47)
LYMPHOCYTES NFR BLD AUTO: 34 % (ref 14–44)
MCH RBC QN AUTO: 30.1 PG (ref 26.8–34.3)
MCHC RBC AUTO-ENTMCNC: 32.2 G/DL (ref 31.4–37.4)
MCV RBC AUTO: 93 FL (ref 82–98)
MONOCYTES # BLD AUTO: 0.53 THOUSAND/ΜL (ref 0.17–1.22)
MONOCYTES NFR BLD AUTO: 9 % (ref 4–12)
NEUTROPHILS # BLD AUTO: 3.44 THOUSANDS/ΜL (ref 1.85–7.62)
NEUTS SEG NFR BLD AUTO: 54 % (ref 43–75)
NRBC BLD AUTO-RTO: 0 /100 WBCS
P AXIS: 26 DEGREES
PLATELET # BLD AUTO: 174 THOUSANDS/UL (ref 149–390)
PMV BLD AUTO: 10.5 FL (ref 8.9–12.7)
POTASSIUM SERPL-SCNC: 3.2 MMOL/L (ref 3.5–5.3)
PR INTERVAL: 134 MS
QRS AXIS: 41 DEGREES
QRSD INTERVAL: 76 MS
QT INTERVAL: 374 MS
QTC INTERVAL: 452 MS
RBC # BLD AUTO: 4.12 MILLION/UL (ref 3.81–5.12)
SODIUM SERPL-SCNC: 144 MMOL/L (ref 136–145)
T WAVE AXIS: 3 DEGREES
VENTRICULAR RATE: 88 BPM
WBC # BLD AUTO: 6.18 THOUSAND/UL (ref 4.31–10.16)

## 2020-08-15 PROCEDURE — 80048 BASIC METABOLIC PNL TOTAL CA: CPT | Performed by: PHYSICIAN ASSISTANT

## 2020-08-15 PROCEDURE — 99217 PR OBSERVATION CARE DISCHARGE MANAGEMENT: CPT | Performed by: HOSPITALIST

## 2020-08-15 PROCEDURE — C9113 INJ PANTOPRAZOLE SODIUM, VIA: HCPCS | Performed by: PHYSICIAN ASSISTANT

## 2020-08-15 PROCEDURE — 85025 COMPLETE CBC W/AUTO DIFF WBC: CPT | Performed by: PHYSICIAN ASSISTANT

## 2020-08-15 RX ORDER — POTASSIUM CHLORIDE 20 MEQ/1
40 TABLET, EXTENDED RELEASE ORAL ONCE
Status: COMPLETED | OUTPATIENT
Start: 2020-08-15 | End: 2020-08-15

## 2020-08-15 RX ORDER — FOLIC ACID 1 MG/1
1 TABLET ORAL DAILY
Status: DISCONTINUED | OUTPATIENT
Start: 2020-08-15 | End: 2020-08-15 | Stop reason: HOSPADM

## 2020-08-15 RX ORDER — THIAMINE MONONITRATE (VIT B1) 100 MG
100 TABLET ORAL DAILY
Status: DISCONTINUED | OUTPATIENT
Start: 2020-08-15 | End: 2020-08-15 | Stop reason: HOSPADM

## 2020-08-15 RX ADMIN — FOLIC ACID 1 MG: 1 TABLET ORAL at 09:13

## 2020-08-15 RX ADMIN — THIAMINE HCL TAB 100 MG 100 MG: 100 TAB at 09:13

## 2020-08-15 RX ADMIN — PANTOPRAZOLE SODIUM 40 MG: 40 INJECTION, POWDER, FOR SOLUTION INTRAVENOUS at 09:14

## 2020-08-15 RX ADMIN — SODIUM CHLORIDE, SODIUM LACTATE, POTASSIUM CHLORIDE, AND CALCIUM CHLORIDE 125 ML/HR: .6; .31; .03; .02 INJECTION, SOLUTION INTRAVENOUS at 00:05

## 2020-08-15 RX ADMIN — Medication 1 TABLET: at 09:13

## 2020-08-15 RX ADMIN — POTASSIUM CHLORIDE 40 MEQ: 1500 TABLET, EXTENDED RELEASE ORAL at 12:05

## 2020-08-15 RX ADMIN — POTASSIUM CHLORIDE 40 MEQ: 1500 TABLET, EXTENDED RELEASE ORAL at 16:41

## 2020-08-15 RX ADMIN — METOCLOPRAMIDE 10 MG: 5 INJECTION, SOLUTION INTRAMUSCULAR; INTRAVENOUS at 07:45

## 2020-08-15 NOTE — PLAN OF CARE
Problem: NEUROSENSORY - ADULT  Goal: Achieves stable or improved neurological status  Description: INTERVENTIONS  - Monitor and report changes in neurological status  - Monitor vital signs such as temperature, blood pressure, glucose, and any other labs ordered   - Initiate measures to prevent increased intracranial pressure  - Monitor for seizure activity and implement precautions if appropriate      Outcome: Progressing  Goal: Remains free of injury related to seizures activity  Description: INTERVENTIONS  - Maintain airway, patient safety  and administer oxygen as ordered  - Monitor patient for seizure activity, document and report duration and description of seizure to physician/advanced practitioner  - If seizure occurs,  ensure patient safety during seizure  - Reorient patient post seizure  - Seizure pads on all 4 side rails  - Instruct patient/family to notify RN of any seizure activity including if an aura is experienced  - Instruct patient/family to call for assistance with activity based on nursing assessment  - Administer anti-seizure medications if ordered    Outcome: Progressing  Goal: Achieves maximal functionality and self care  Description: INTERVENTIONS  - Monitor swallowing and airway patency with patient fatigue and changes in neurological status  - Encourage and assist patient to increase activity and self care     - Encourage visually impaired, hearing impaired and aphasic patients to use assistive/communication devices  Outcome: Progressing     Problem: CARDIOVASCULAR - ADULT  Goal: Maintains optimal cardiac output and hemodynamic stability  Description: INTERVENTIONS:  - Monitor I/O, vital signs and rhythm  - Monitor for S/S and trends of decreased cardiac output  - Administer and titrate ordered vasoactive medications to optimize hemodynamic stability  - Assess quality of pulses, skin color and temperature  - Assess for signs of decreased coronary artery perfusion  - Instruct patient to report change in severity of symptoms  Outcome: Progressing  Goal: Absence of cardiac dysrhythmias or at baseline rhythm  Description: INTERVENTIONS:  - Continuous cardiac monitoring, vital signs, obtain 12 lead EKG if ordered  - Administer antiarrhythmic and heart rate control medications as ordered  - Monitor electrolytes and administer replacement therapy as ordered  Outcome: Progressing     Problem: RESPIRATORY - ADULT  Goal: Achieves optimal ventilation and oxygenation  Description: INTERVENTIONS:  - Assess for changes in respiratory status  - Assess for changes in mentation and behavior  - Position to facilitate oxygenation and minimize respiratory effort  - Oxygen administered by appropriate delivery if ordered  - Initiate smoking cessation education as indicated  - Encourage broncho-pulmonary hygiene including cough, deep breathe, Incentive Spirometry  - Assess the need for suctioning and aspirate as needed  - Assess and instruct to report SOB or any respiratory difficulty  - Respiratory Therapy support as indicated  Outcome: Progressing     Problem: GASTROINTESTINAL - ADULT  Goal: Minimal or absence of nausea and/or vomiting  Description: INTERVENTIONS:  - Administer IV fluids if ordered to ensure adequate hydration  - Maintain NPO status until nausea and vomiting are resolved  - Nasogastric tube if ordered  - Administer ordered antiemetic medications as needed  - Provide nonpharmacologic comfort measures as appropriate  - Advance diet as tolerated, if ordered  - Consider nutrition services referral to assist patient with adequate nutrition and appropriate food choices  Outcome: Progressing  Goal: Maintains or returns to baseline bowel function  Description: INTERVENTIONS:  - Assess bowel function  - Encourage oral fluids to ensure adequate hydration  - Administer IV fluids if ordered to ensure adequate hydration  - Administer ordered medications as needed  - Encourage mobilization and activity  - Consider nutritional services referral to assist patient with adequate nutrition and appropriate food choices  Outcome: Progressing  Goal: Maintains adequate nutritional intake  Description: INTERVENTIONS:  - Monitor percentage of each meal consumed  - Identify factors contributing to decreased intake, treat as appropriate  - Assist with meals as needed  - Monitor I&O, weight, and lab values if indicated  - Obtain nutrition services referral as needed  Outcome: Progressing     Problem: GENITOURINARY - ADULT  Goal: Maintains or returns to baseline urinary function  Description: INTERVENTIONS:  - Assess urinary function  - Encourage oral fluids to ensure adequate hydration if ordered  - Administer IV fluids as ordered to ensure adequate hydration  - Administer ordered medications as needed  - Offer frequent toileting  - Follow urinary retention protocol if ordered  Outcome: Progressing  Goal: Absence of urinary retention  Description: INTERVENTIONS:  - Assess patients ability to void and empty bladder  - Monitor I/O  - Bladder scan as needed  - Discuss with physician/AP medications to alleviate retention as needed  - Discuss catheterization for long term situations as appropriate  Outcome: Progressing     Problem: MUSCULOSKELETAL - ADULT  Goal: Maintain or return mobility to safest level of function  Description: INTERVENTIONS:  - Assess patient's ability to carry out ADLs; assess patient's baseline for ADL function and identify physical deficits which impact ability to perform ADLs (bathing, care of mouth/teeth, toileting, grooming, dressing, etc )  - Assess/evaluate cause of self-care deficits   - Assess range of motion  - Assess patient's mobility  - Assess patient's need for assistive devices and provide as appropriate  - Encourage maximum independence but intervene and supervise when necessary  - Involve family in performance of ADLs  - Assess for home care needs following discharge   - Consider OT consult to assist with ADL evaluation and planning for discharge  - Provide patient education as appropriate  Outcome: Progressing  Goal: Maintain proper alignment of affected body part  Description: INTERVENTIONS:  - Support, maintain and protect limb and body alignment  - Provide patient/ family with appropriate education  Outcome: Progressing     Problem: Potential for Falls  Goal: Patient will remain free of falls  Description: INTERVENTIONS:  - Assess patient frequently for physical needs  -  Identify cognitive and physical deficits and behaviors that affect risk of falls    -  [place]gnar fall precautions as indicated by assessment   - Educate patient/family on patient safety including physical limitations  - Instruct patient to call for assistance with activity based on assessment  - Modify environment to reduce risk of injury  - Consider OT/PT consult to assist with strengthening/mobility  Outcome: Progressing

## 2020-08-15 NOTE — DISCHARGE SUMMARY
Discharge- Ann Marie Valadez 1955, 63 y o  female MRN: [id number]    Unit/Bed#: E5 -01 Encounter: [id number]    Primary Care Provider: No primary care provider on file  Date and time admitted to hospital: 8/14/2020 11:05 AM        Isopropyl alcohol poisoning  Assessment & Plan  No problems with lethargy/nausea/nor vomiting    She is very anxious to go home  I instructed her obviously to never drink any over the counter alcohols, such as isopropyl alcohol, methanol, ethylyn glucol etc     I also instructed her to not drink any ETOH for a few days to make sure everything is out of her system  She expressed understanding    * Acute alcoholic gastritis with hemorrhage  Assessment & Plan  Has resolved  No Gi bleeding here  Bev Redd to go home        Discharging Physician / Practitioner: Julianna Porras DO  PCP: No primary care provider on file  Admission Date:   Admission Orders (From admission, onward)     Ordered        08/14/20 1315  Place in Observation  Once                   Discharge Date: 08/15/20    Resolved Problems  Date Reviewed: 8/14/2020    None            Consultations During Hospital Stay:  · toxicology      ·       Reason for Admission: isopropyl alcohol injestion  Hospital Course:     Ann Marie Valadez is a 63 y o  female patient who originally presented to the hospital on 8/14/2020 due to ingesting rubbing alcohol  She drank half a bottle of rubbing alcohol, because she ran out of regular alcohol  She had some nausea and stomach pain  She had no lethargy or confusion  She was observed overnight and was hydrated with IV fluids  She has had no issues  I offerred to keep her overnight, but she is very anxious to go home  She has had 24 hours of hydration  Please see above list of diagnoses and related plan for additional information         Condition at Discharge: good       Discharge Day Visit / Exam:     Subjective:  Feels well  No sedation  No nausea nor vomiting  No bloody nor black stools  She wants to go home  Vitals: Blood Pressure: 130/71 (08/15/20 1202)  Pulse: 66 (08/15/20 1202)  Temperature: 97 7 °F (36 5 °C) (08/15/20 1202)  Temp Source: Temporal (08/15/20 1202)  Respirations: 18 (08/15/20 1202)  Weight - Scale: 87 6 kg (193 lb 2 oz) (08/14/20 1111)  SpO2: 95 % (08/15/20 1202)    Exam:     Physical Exam  Vitals signs and nursing note reviewed  HENT:      Head: Normocephalic and atraumatic  Eyes:      Pupils: Pupils are equal, round, and reactive to light  Cardiovascular:      Rate and Rhythm: Normal rate and regular rhythm  Heart sounds: No murmur  No friction rub  No gallop  Pulmonary:      Effort: Pulmonary effort is normal       Breath sounds: Normal breath sounds  No wheezing or rales  Abdominal:      General: Bowel sounds are normal       Palpations: Abdomen is soft  Tenderness: There is no abdominal tenderness  Femi Merle Discharge instructions/Information to patient and family:   See after visit summary for information provided to patient and family  Provisions for Follow-Up Care:  See after visit summary for information related to follow-up care and any pertinent home health orders  Disposition:     Home       Discharge Statement:  I spent 20 minutes discharging the patient  This time was spent on the day of discharge  I had direct contact with the patient on the day of discharge  Greater than 50% of the total time was spent examining patient, answering all patient questions, arranging and discussing plan of care with patient as well as directly providing post-discharge instructions  Additional time then spent on discharge activities  Discharge Medications:  See after visit summary for reconciled discharge medications provided to patient and family        ** Please Note: This note has been constructed using a voice recognition system **

## 2020-08-15 NOTE — PLAN OF CARE
Problem: NEUROSENSORY - ADULT  Goal: Achieves stable or improved neurological status  Description: INTERVENTIONS  - Monitor and report changes in neurological status  - Monitor vital signs such as temperature, blood pressure, glucose, and any other labs ordered   - Initiate measures to prevent increased intracranial pressure  - Monitor for seizure activity and implement precautions if appropriate      Outcome: Progressing  Goal: Remains free of injury related to seizures activity  Description: INTERVENTIONS  - Maintain airway, patient safety  and administer oxygen as ordered  - Monitor patient for seizure activity, document and report duration and description of seizure to physician/advanced practitioner  - If seizure occurs,  ensure patient safety during seizure  - Reorient patient post seizure  - Seizure pads on all 4 side rails  - Instruct patient/family to notify RN of any seizure activity including if an aura is experienced  - Instruct patient/family to call for assistance with activity based on nursing assessment  - Administer anti-seizure medications if ordered    Outcome: Progressing  Goal: Achieves maximal functionality and self care  Description: INTERVENTIONS  - Monitor swallowing and airway patency with patient fatigue and changes in neurological status  - Encourage and assist patient to increase activity and self care     - Encourage visually impaired, hearing impaired and aphasic patients to use assistive/communication devices  Outcome: Progressing     Problem: CARDIOVASCULAR - ADULT  Goal: Maintains optimal cardiac output and hemodynamic stability  Description: INTERVENTIONS:  - Monitor I/O, vital signs and rhythm  - Monitor for S/S and trends of decreased cardiac output  - Administer and titrate ordered vasoactive medications to optimize hemodynamic stability  - Assess quality of pulses, skin color and temperature  - Assess for signs of decreased coronary artery perfusion  - Instruct patient to report change in severity of symptoms  Outcome: Progressing  Goal: Absence of cardiac dysrhythmias or at baseline rhythm  Description: INTERVENTIONS:  - Continuous cardiac monitoring, vital signs, obtain 12 lead EKG if ordered  - Administer antiarrhythmic and heart rate control medications as ordered  - Monitor electrolytes and administer replacement therapy as ordered  Outcome: Progressing     Problem: RESPIRATORY - ADULT  Goal: Achieves optimal ventilation and oxygenation  Description: INTERVENTIONS:  - Assess for changes in respiratory status  - Assess for changes in mentation and behavior  - Position to facilitate oxygenation and minimize respiratory effort  - Oxygen administered by appropriate delivery if ordered  - Initiate smoking cessation education as indicated  - Encourage broncho-pulmonary hygiene including cough, deep breathe, Incentive Spirometry  - Assess the need for suctioning and aspirate as needed  - Assess and instruct to report SOB or any respiratory difficulty  - Respiratory Therapy support as indicated  Outcome: Progressing     Problem: GASTROINTESTINAL - ADULT  Goal: Minimal or absence of nausea and/or vomiting  Description: INTERVENTIONS:  - Administer IV fluids if ordered to ensure adequate hydration  - Maintain NPO status until nausea and vomiting are resolved  - Nasogastric tube if ordered  - Administer ordered antiemetic medications as needed  - Provide nonpharmacologic comfort measures as appropriate  - Advance diet as tolerated, if ordered  - Consider nutrition services referral to assist patient with adequate nutrition and appropriate food choices  Outcome: Progressing  Goal: Maintains or returns to baseline bowel function  Description: INTERVENTIONS:  - Assess bowel function  - Encourage oral fluids to ensure adequate hydration  - Administer IV fluids if ordered to ensure adequate hydration  - Administer ordered medications as needed  - Encourage mobilization and activity  - Consider nutritional services referral to assist patient with adequate nutrition and appropriate food choices  Outcome: Progressing  Goal: Maintains adequate nutritional intake  Description: INTERVENTIONS:  - Monitor percentage of each meal consumed  - Identify factors contributing to decreased intake, treat as appropriate  - Assist with meals as needed  - Monitor I&O, weight, and lab values if indicated  - Obtain nutrition services referral as needed  Outcome: Progressing     Problem: GENITOURINARY - ADULT  Goal: Maintains or returns to baseline urinary function  Description: INTERVENTIONS:  - Assess urinary function  - Encourage oral fluids to ensure adequate hydration if ordered  - Administer IV fluids as ordered to ensure adequate hydration  - Administer ordered medications as needed  - Offer frequent toileting  - Follow urinary retention protocol if ordered  Outcome: Progressing  Goal: Absence of urinary retention  Description: INTERVENTIONS:  - Assess patients ability to void and empty bladder  - Monitor I/O  - Bladder scan as needed  - Discuss with physician/AP medications to alleviate retention as needed  - Discuss catheterization for long term situations as appropriate  Outcome: Progressing     Problem: METABOLIC, FLUID AND ELECTROLYTES - ADULT  Goal: Electrolytes maintained within normal limits  Description: INTERVENTIONS:  - Monitor labs and assess patient for signs and symptoms of electrolyte imbalances  - Administer electrolyte replacement as ordered  - Monitor response to electrolyte replacements, including repeat lab results as appropriate  - Instruct patient on fluid and nutrition as appropriate  Outcome: Progressing  Goal: Fluid balance maintained  Description: INTERVENTIONS:  - Monitor labs   - Monitor I/O and WT  - Instruct patient on fluid and nutrition as appropriate  - Assess for signs & symptoms of volume excess or deficit  Outcome: Progressing     Problem: SKIN/TISSUE INTEGRITY - ADULT  Goal: Skin integrity remains intact  Description: INTERVENTIONS  - Identify patients at risk for skin breakdown  - Assess and monitor skin integrity  - Assess and monitor nutrition and hydration status  - Monitor labs (i e  albumin)  - Assess for incontinence   - Turn and reposition patient  - Assist with mobility/ambulation  - Relieve pressure over bony prominences  - Avoid friction and shearing  - Provide appropriate hygiene as needed including keeping skin clean and dry  - Evaluate need for skin moisturizer/barrier cream  - Collaborate with interdisciplinary team (i e  Nutrition, Rehabilitation, etc )   - Patient/family teaching  Outcome: Progressing  Goal: Incision(s), wounds(s) or drain site(s) healing without S/S of infection  Description: INTERVENTIONS  - Assess and document risk factors for skin impairment   - Assess and document dressing, incision, wound bed, drain sites and surrounding tissue  - Consider nutrition services referral as needed  - Oral mucous membranes remain intact  - Provide patient/ family education  Outcome: Progressing  Goal: Oral mucous membranes remain intact  Description: INTERVENTIONS  - Assess oral mucosa and hygiene practices  - Implement preventative oral hygiene regimen  - Implement oral medicated treatments as ordered  - Initiate Nutrition services referral as needed  Outcome: Progressing     Problem: HEMATOLOGIC - ADULT  Goal: Maintains hematologic stability  Description: INTERVENTIONS  - Assess for signs and symptoms of bleeding or hemorrhage  - Monitor labs  - Administer supportive blood products/factors as ordered and appropriate  Outcome: Progressing     Problem: MUSCULOSKELETAL - ADULT  Goal: Maintain or return mobility to safest level of function  Description: INTERVENTIONS:  - Assess patient's ability to carry out ADLs; assess patient's baseline for ADL function and identify physical deficits which impact ability to perform ADLs (bathing, care of mouth/teeth, toileting, grooming, dressing, etc )  - Assess/evaluate cause of self-care deficits   - Assess range of motion  - Assess patient's mobility  - Assess patient's need for assistive devices and provide as appropriate  - Encourage maximum independence but intervene and supervise when necessary  - Involve family in performance of ADLs  - Assess for home care needs following discharge   - Consider OT consult to assist with ADL evaluation and planning for discharge  - Provide patient education as appropriate  Outcome: Progressing  Goal: Maintain proper alignment of affected body part  Description: INTERVENTIONS:  - Support, maintain and protect limb and body alignment  - Provide patient/ family with appropriate education  Outcome: Progressing     Problem: Potential for Falls  Goal: Patient will remain free of falls  Description: INTERVENTIONS:  - Assess patient frequently for physical needs  -  Identify cognitive and physical deficits and behaviors that affect risk of falls    -  [place] fall precautions as indicated by assessment   - Educate patient/family on patient safety including physical limitations  - Instruct patient to call for assistance with activity based on assessment  - Modify environment to reduce risk of injury  - Consider OT/PT consult to assist with strengthening/mobility  Outcome: Progressing

## 2020-08-15 NOTE — UTILIZATION REVIEW
Initial Clinical Review    Admission: Date/Time/Statement:   Admission Orders (From admission, onward)     Ordered        08/14/20 1315  Place in Observation  Once                   Orders Placed This Encounter   Procedures    Place in Observation     Standing Status:   Standing     Number of Occurrences:   1     Order Specific Question:   Admitting Physician     Answer:   Frankie Cervantes [[id number]]     Order Specific Question:   Level of Care     Answer:   Med Surg [[id number]]     ED Arrival Information     Expected Arrival Acuity Means of Arrival Escorted By Service Admission Type    - 8/14/2020 11:05 Urgent Ambulance [facility] ([address]) Hospitalist Urgent    Arrival Complaint    Alcohol Problem        Chief Complaint   Patient presents with    Abdominal Pain     pt has been drinking wine, ran out of wine and drank 1/2 bottle of rubbing alcohol  reports abdominal pain and vomiting blood  Assessment/Plan: 62 yo female with PMH  alcohol abuse, psoriatic arthritis presents to ED with severe abdominal pain ad blood in vomit  + nausea  States she drank a half bottle of rubbing alcohol after she ran out of wine  Patient notes she did this 1 other time last October  EMS gave Zofran PTA  Findings: K 3 2, Cr 2 07, wbc's 12 76, venous ph 7 646 - pco2  24 6, Urine drug neg  IN the ED she rec'd IVF's, IV Zofran and IV Protonix  Toxicology was consulted in ED -patient is no longer intoxicated and unlikely to have delayed sequela  The bump in her creatinine is likely due to acetone interfering with the creatinine assay  No need for specific it antidote at this time  Treatment of hemorrhagic gastritis per primary team   Admitted to Observation with Acute alcoholic gastritis with hemorrhage,Isopropyl alcohol poisoning, Hypokalemia, Chronic alcoholism    Plan: Tele, IVFs, antiemetics,  IV Protonix, NPO, trend Hg, Replete K, recheck bmp in am, Thiamine and folic acid, Monitor [facility]    ED Triage Vitals   Temperature Pulse Respirations Blood Pressure SpO2   08/14/20 1111 08/14/20 1111 08/14/20 1111 08/14/20 1111 08/14/20 1111   98 6 °F (37 °C) (!) 108 18 134/66 97 %      Temp Source Heart Rate Source Patient Position - Orthostatic VS BP Location FiO2 (%)   08/14/20 1111 08/14/20 1111 08/14/20 1111 08/14/20 1111 --   Oral Monitor Lying Left arm       Pain Score       08/14/20 1530       No Pain          Wt Readings from Last 1 Encounters:   08/14/20 87 6 kg (193 lb 2 oz)     Additional Vital Signs:   08/15/20 0700   98 3 °F (36 8 °C)   67   18   131/71   95 %   None (Room air)   Lying    08/15/20 0300   98 °F (36 7 °C)   68   18   127/59   94 %   None (Room air)   Lying    08/15/20 0141               96 %   None (Room air)       08/14/20 2300   98 8 °F (37 1 °C)   86   18   106/51   97 %   None (Room air)   Lying    08/14/20 1407      88   20   135/69   96 %      Lying    08/14/20 1237                  None (Room air)       08/14/20 1149      89   22   134/66   99 %   None (Room air)   Lying     [facility] SCORES:  8/14: 9                                8/15: 1    Pertinent Labs/Diagnostic Test Results:   Results from last 7 days   Lab Units 08/15/20  0501 08/14/20  1141   WBC Thousand/uL 6 18 12 76*   HEMOGLOBIN g/dL 12 4 13 8   HEMATOCRIT % 38 5 41 7   PLATELETS Thousands/uL 174 257   NEUTROS ABS Thousands/µL 3 44 9 68*     Results from last 7 days   Lab Units 08/15/20  0501 08/14/20  1141   SODIUM mmol/L 144 142   POTASSIUM mmol/L 3 2* 3 2*   CHLORIDE mmol/L 108 104   CO2 mmol/L 26 23   ANION GAP mmol/L 10 15*   BUN mg/dL 10 20   CREATININE mg/dL 1 48* 2 07*   EGFR ml/min/1 73sq m 37 25   CALCIUM mg/dL 8 4 8 6   MAGNESIUM mg/dL  --  2 0     Results from last 7 days   Lab Units 08/14/20  1141   AST U/L 21   ALT U/L 30   ALK PHOS U/L 88   TOTAL PROTEIN g/dL 7 2   ALBUMIN g/dL 3 7   TOTAL BILIRUBIN mg/dL 0 42     Results from last 7 days   Lab Units 08/15/20  0501 08/14/20  1141   GLUCOSE RANDOM mg/dL 96 118     Results from last 7 days   Lab Units 08/14/20  1141   PH SEGUNDO  7 646*   PCO2 SEGUNDO mm Hg 24 6*   PO2 SEGUNDO mm Hg 99 6*   HCO3 SEGUNDO mmol/L 26 2   BASE EXC SEGUNDO mmol/L 6 7   O2 CONTENT SEGUNDO ml/dL 19 5   O2 HGB, VENOUS % 96 4*     Results from last 7 days   Lab Units 08/14/20  1141   PROTIME seconds 13 6   INR  1 06   PTT seconds 27     Results from last 7 days   Lab Units 08/14/20  1133   CLARITY UA  Clear   COLOR UA  Yellow   SPEC GRAV UA  1 025   PH UA  7 0   GLUCOSE UA mg/dl Negative   KETONES UA mg/dl >=160 (4+)*   BLOOD UA  Moderate*   PROTEIN UA mg/dl Trace*   NITRITE UA  Negative   BILIRUBIN UA  Interference- unable to analyze*   UROBILINOGEN UA E U /dl 0 2   LEUKOCYTES UA  Negative   WBC UA /hpf 2-4*   RBC UA /hpf None Seen   BACTERIA UA /hpf Occasional   EPITHELIAL CELLS WET PREP /hpf Occasional     Results from last 7 days   Lab Units 08/14/20  1132   AMPH/METH  Negative   BARBITURATE UR  Negative   BENZODIAZEPINE UR  Negative   COCAINE UR  Negative   METHADONE URINE  Negative   OPIATE UR  Negative   PCP UR  Negative   THC UR  Negative     Results from last 7 days   Lab Units 08/14/20  1339 08/14/20  1141   ETHANOL LVL mg/dL <3  --    ACETAMINOPHEN LVL ug/mL  --  <2*   SALICYLATE LVL mg/dL  --  <3*     8/14 EKG  NSR  ED Treatment:   Medication Administration from 08/14/2020 1105 to 08/14/2020 1507       Date/Time Order Dose Route Action     08/14/2020 1109 ondansetron (FOR EMS ONLY) (ZOFRAN) 4 mg/2 mL injection 4 mg 0 mg Does not apply Given to EMS     08/14/2020 1143 sodium chloride 0 9 % bolus 1,000 mL 1,000 mL Intravenous New Bag     08/14/2020 1146 ondansetron (ZOFRAN) injection 4 mg 4 mg Intravenous Given     08/14/2020 1146 pantoprazole (PROTONIX) injection 40 mg 40 mg Intravenous Given     08/14/2020 1329 lactated ringers bolus 1,000 mL 1,000 mL Intravenous New Bag     08/14/2020 1329 lactated ringers infusion 125 mL/hr Intravenous New Bag        Past Medical History:   Diagnosis Date    Arthritis     Disease of thyroid gland     Esophageal ulceration      Present on Admission:   Acute alcoholic gastritis with hemorrhage   Hypokalemia   Isopropyl alcohol poisoning   Chronic alcoholism ([facility] [address] )      Admitting Diagnosis: Dehydration [E86 0]  Hemorrhagic gastritis [K29 71]  Isopropyl alcohol poisoning [T51 2X1A]  Abdominal pain [R10 9]  Age/Sex: 63 y o  female     Admission Orders:  Scheduled Medications:  folic acid, 1 mg, Oral, Daily  multivitamin-minerals, 1 tablet, Oral, Daily  pantoprazole, 40 mg, Intravenous, Q12H [address]  thiamine, 100 mg, Oral, Daily    Continuous IV Infusions:  lactated ringers, 125 mL/hr, Intravenous, Continuous    PRN Meds:  acetaminophen, 650 mg, Oral, Q6H PRN  calcium carbonate, 1,000 mg, Oral, TID PRN  melatonin, 6 mg, Oral, HS PRN  metoclopramide, 10 mg, Intravenous, Q6H PRN x 1 8/14  & x 1 8/15  ondansetron, 4 mg, Intravenous, Q6H PRN   X 1 8/14    potassium chloride 20 mEq IVPB (premix)    Ordered Dose: 20 mEq  Route: Intravenous  Frequency: Once @ 50 mL/hr over 2 Hours        TELE  Continuous Pulse Ox  CIWA monitoring  SCDs  IP CONSULT TO TOXICOLOGY    Network Utilization Review Department  [email or web address]  ATTENTION: Please call with any questions or concerns to [phone] and carefully listen to the prompts so that you are directed to the right person  All voicemails are confidential   Angus Wu all requests for admission clinical reviews, approved or denied determinations and any other requests to dedicated fax number below belonging to the campus where the patient is receiving treatment   List of dedicated fax numbers for the Facilities:  FACILITY NAME UR FAX NUMBER   ADMISSION DENIALS (Administrative/Medical Necessity) [phone]   [address] (Maternity/NICU/Pediatrics) [phone]   Armin Shah [phone]   Sushil Manuel [phone]   [facility] [phone]   [address] [facility]  [phone]     [facility] [address] [facility] [phone]   Amie Sham [address] [phone] [address] [facility]  [phone] [address] [place] [phone]

## 2020-08-15 NOTE — NURSING NOTE
Pt verbalized understanding to discharge instructions and medications  All questions answered  Pt ate crackers and  had water before leaving with no complaints of nausea or vomiting  Pt left with all belongings accompany by PCA   to private vehcle by ambulation

## 2020-08-15 NOTE — ASSESSMENT & PLAN NOTE
No problems with lethargy/nausea/nor vomiting    She is very anxious to go home  I instructed her obviously to never drink any over the counter alcohols, such as isopropyl alcohol, methanol, ethylyn glucol etc     I also instructed her to not drink any ETOH for a few days to make sure everything is out of her system     She expressed understanding

## 2020-10-20 ENCOUNTER — HOSPITAL ENCOUNTER (INPATIENT)
Facility: HOSPITAL | Age: 65
LOS: 2 days | Discharge: HOME | DRG: 243 | End: 2020-10-22
Attending: EMERGENCY MEDICINE | Admitting: INTERNAL MEDICINE
Payer: COMMERCIAL

## 2020-10-20 ENCOUNTER — ANESTHESIA EVENT (INPATIENT)
Dept: GASTROENTEROLOGY | Facility: HOSPITAL | Age: 65
DRG: 243 | End: 2020-10-20
Payer: COMMERCIAL

## 2020-10-20 ENCOUNTER — APPOINTMENT (EMERGENCY)
Dept: RADIOLOGY | Facility: HOSPITAL | Age: 65
DRG: 243 | End: 2020-10-20
Payer: COMMERCIAL

## 2020-10-20 ENCOUNTER — APPOINTMENT (INPATIENT)
Dept: ULTRASOUND IMAGING | Facility: HOSPITAL | Age: 65
DRG: 243 | End: 2020-10-20
Payer: COMMERCIAL

## 2020-10-20 DIAGNOSIS — R00.0 TACHYCARDIA: ICD-10-CM

## 2020-10-20 DIAGNOSIS — N17.9 AKI (ACUTE KIDNEY INJURY) (HCC): ICD-10-CM

## 2020-10-20 DIAGNOSIS — K92.0 HEMATEMESIS: Primary | ICD-10-CM

## 2020-10-20 DIAGNOSIS — E87.2 METABOLIC ACIDOSIS, INCREASED ANION GAP: ICD-10-CM

## 2020-10-20 DIAGNOSIS — K29.21 ACUTE ALCOHOLIC GASTRITIS WITH HEMORRHAGE: ICD-10-CM

## 2020-10-20 DIAGNOSIS — Z78.9 ALCOHOL INGESTION: ICD-10-CM

## 2020-10-20 PROBLEM — G92.8 TOXIC METABOLIC ENCEPHALOPATHY: Status: RESOLVED | Noted: 2019-10-29 | Resolved: 2020-10-20

## 2020-10-20 PROBLEM — E87.6 HYPOKALEMIA: Status: RESOLVED | Noted: 2019-10-29 | Resolved: 2020-10-20

## 2020-10-20 PROBLEM — R10.9 ABDOMINAL PAIN: Status: ACTIVE | Noted: 2020-10-20

## 2020-10-20 PROBLEM — L40.50 PSORIATIC ARTHRITIS (HCC): Status: ACTIVE | Noted: 2020-10-20

## 2020-10-20 PROBLEM — L40.50 PSORIATIC ARTHRITIS (HCC): Chronic | Status: ACTIVE | Noted: 2020-10-20

## 2020-10-20 PROBLEM — R79.89 ELEVATED SERUM CREATININE: Status: ACTIVE | Noted: 2020-10-20

## 2020-10-20 LAB
ABO GROUP BLD: NORMAL
ALBUMIN SERPL BCP-MCNC: 3.3 G/DL (ref 3.5–5)
ALP SERPL-CCNC: 123 U/L (ref 46–116)
ALT SERPL W P-5'-P-CCNC: 35 U/L (ref 12–78)
ANION GAP SERPL CALCULATED.3IONS-SCNC: 11 MMOL/L (ref 4–13)
ANION GAP SERPL CALCULATED.3IONS-SCNC: 15 MMOL/L (ref 4–13)
APAP SERPL-MCNC: <2 UG/ML (ref 10–20)
APTT PPP: 27 SECONDS (ref 23–37)
AST SERPL W P-5'-P-CCNC: 51 U/L (ref 5–45)
ATRIAL RATE: 130 BPM
BASE EX.OXY STD BLDV CALC-SCNC: 96.1 % (ref 60–80)
BASE EXCESS BLDV CALC-SCNC: 0.7 MMOL/L
BASOPHILS # BLD AUTO: 0.02 THOUSANDS/ΜL (ref 0–0.1)
BASOPHILS NFR BLD AUTO: 0 % (ref 0–1)
BILIRUB SERPL-MCNC: 0.5 MG/DL (ref 0.2–1)
BLD GP AB SCN SERPL QL: NEGATIVE
BUN SERPL-MCNC: 31 MG/DL (ref 5–25)
BUN SERPL-MCNC: 32 MG/DL (ref 5–25)
CALCIUM ALBUM COR SERPL-MCNC: 9.2 MG/DL (ref 8.3–10.1)
CALCIUM SERPL-MCNC: 7.8 MG/DL (ref 8.3–10.1)
CALCIUM SERPL-MCNC: 8.6 MG/DL (ref 8.3–10.1)
CHLORIDE SERPL-SCNC: 104 MMOL/L (ref 100–108)
CHLORIDE SERPL-SCNC: 99 MMOL/L (ref 100–108)
CO2 SERPL-SCNC: 20 MMOL/L (ref 21–32)
CO2 SERPL-SCNC: 23 MMOL/L (ref 21–32)
CREAT SERPL-MCNC: 2.05 MG/DL (ref 0.6–1.3)
CREAT SERPL-MCNC: 2.22 MG/DL (ref 0.6–1.3)
EOSINOPHIL # BLD AUTO: 0 THOUSAND/ΜL (ref 0–0.61)
EOSINOPHIL NFR BLD AUTO: 0 % (ref 0–6)
ERYTHROCYTE [DISTWIDTH] IN BLOOD BY AUTOMATED COUNT: 13.5 % (ref 11.6–15.1)
ETHANOL SERPL-MCNC: <3 MG/DL (ref 0–3)
GFR SERPL CREATININE-BSD FRML MDRD: 23 ML/MIN/1.73SQ M
GFR SERPL CREATININE-BSD FRML MDRD: 25 ML/MIN/1.73SQ M
GLUCOSE SERPL-MCNC: 122 MG/DL (ref 65–140)
GLUCOSE SERPL-MCNC: 152 MG/DL (ref 65–140)
GLUCOSE SERPL-MCNC: 164 MG/DL (ref 65–140)
HCO3 BLDV-SCNC: 21.1 MMOL/L (ref 24–30)
HCT VFR BLD AUTO: 37.6 % (ref 34.8–46.1)
HCT VFR BLD AUTO: 42.9 % (ref 34.8–46.1)
HGB BLD-MCNC: 11.7 G/DL (ref 11.5–15.4)
HGB BLD-MCNC: 12.5 G/DL (ref 11.5–15.4)
HGB BLD-MCNC: 14.1 G/DL (ref 11.5–15.4)
IMM GRANULOCYTES # BLD AUTO: 0.1 THOUSAND/UL (ref 0–0.2)
IMM GRANULOCYTES NFR BLD AUTO: 1 % (ref 0–2)
INR PPP: 1.12 (ref 0.84–1.19)
LIPASE SERPL-CCNC: 179 U/L (ref 73–393)
LYMPHOCYTES # BLD AUTO: 1.94 THOUSANDS/ΜL (ref 0.6–4.47)
LYMPHOCYTES NFR BLD AUTO: 12 % (ref 14–44)
MCH RBC QN AUTO: 29.3 PG (ref 26.8–34.3)
MCHC RBC AUTO-ENTMCNC: 32.9 G/DL (ref 31.4–37.4)
MCV RBC AUTO: 89 FL (ref 82–98)
MONOCYTES # BLD AUTO: 0.74 THOUSAND/ΜL (ref 0.17–1.22)
MONOCYTES NFR BLD AUTO: 5 % (ref 4–12)
NEUTROPHILS # BLD AUTO: 13.8 THOUSANDS/ΜL (ref 1.85–7.62)
NEUTS SEG NFR BLD AUTO: 82 % (ref 43–75)
NRBC BLD AUTO-RTO: 0 /100 WBCS
O2 CT BLDV-SCNC: 19.3 ML/DL
P AXIS: 82 DEGREES
PCO2 BLDV: 24 MM HG (ref 42–50)
PH BLDV: 7.56 [PH] (ref 7.3–7.4)
PLATELET # BLD AUTO: 283 THOUSANDS/UL (ref 149–390)
PMV BLD AUTO: 9.9 FL (ref 8.9–12.7)
PO2 BLDV: 100.2 MM HG (ref 35–45)
POTASSIUM SERPL-SCNC: 3.1 MMOL/L (ref 3.5–5.3)
POTASSIUM SERPL-SCNC: 3.8 MMOL/L (ref 3.5–5.3)
PR INTERVAL: 120 MS
PROT SERPL-MCNC: 7.5 G/DL (ref 6.4–8.2)
PROTHROMBIN TIME: 14.2 SECONDS (ref 11.6–14.5)
QRS AXIS: 78 DEGREES
QRSD INTERVAL: 70 MS
QT INTERVAL: 322 MS
QTC INTERVAL: 473 MS
RBC # BLD AUTO: 4.82 MILLION/UL (ref 3.81–5.12)
RH BLD: NEGATIVE
SALICYLATES SERPL-MCNC: <3 MG/DL (ref 3–20)
SODIUM SERPL-SCNC: 134 MMOL/L (ref 136–145)
SODIUM SERPL-SCNC: 138 MMOL/L (ref 136–145)
SPECIMEN EXPIRATION DATE: NORMAL
T WAVE AXIS: 58 DEGREES
TSH SERPL DL<=0.05 MIU/L-ACNC: 0.32 UIU/ML (ref 0.36–3.74)
VENTRICULAR RATE: 130 BPM
WBC # BLD AUTO: 16.6 THOUSAND/UL (ref 4.31–10.16)

## 2020-10-20 PROCEDURE — 82805 BLOOD GASES W/O2 SATURATION: CPT | Performed by: EMERGENCY MEDICINE

## 2020-10-20 PROCEDURE — 93010 ELECTROCARDIOGRAM REPORT: CPT

## 2020-10-20 PROCEDURE — 76770 US EXAM ABDO BACK WALL COMP: CPT

## 2020-10-20 PROCEDURE — 85610 PROTHROMBIN TIME: CPT | Performed by: EMERGENCY MEDICINE

## 2020-10-20 PROCEDURE — 96361 HYDRATE IV INFUSION ADD-ON: CPT

## 2020-10-20 PROCEDURE — 82948 REAGENT STRIP/BLOOD GLUCOSE: CPT

## 2020-10-20 PROCEDURE — 99285 EMERGENCY DEPT VISIT HI MDM: CPT | Performed by: EMERGENCY MEDICINE

## 2020-10-20 PROCEDURE — 36415 COLL VENOUS BLD VENIPUNCTURE: CPT | Performed by: EMERGENCY MEDICINE

## 2020-10-20 PROCEDURE — 99449 NTRPROF PH1/NTRNET/EHR 31/>: CPT | Performed by: EMERGENCY MEDICINE

## 2020-10-20 PROCEDURE — 83690 ASSAY OF LIPASE: CPT | Performed by: EMERGENCY MEDICINE

## 2020-10-20 PROCEDURE — 80329 ANALGESICS NON-OPIOID 1 OR 2: CPT | Performed by: EMERGENCY MEDICINE

## 2020-10-20 PROCEDURE — 85730 THROMBOPLASTIN TIME PARTIAL: CPT | Performed by: EMERGENCY MEDICINE

## 2020-10-20 PROCEDURE — C9113 INJ PANTOPRAZOLE SODIUM, VIA: HCPCS | Performed by: INTERNAL MEDICINE

## 2020-10-20 PROCEDURE — 85018 HEMOGLOBIN: CPT | Performed by: INTERNAL MEDICINE

## 2020-10-20 PROCEDURE — 80320 DRUG SCREEN QUANTALCOHOLS: CPT | Performed by: EMERGENCY MEDICINE

## 2020-10-20 PROCEDURE — 86901 BLOOD TYPING SEROLOGIC RH(D): CPT | Performed by: EMERGENCY MEDICINE

## 2020-10-20 PROCEDURE — 84443 ASSAY THYROID STIM HORMONE: CPT | Performed by: EMERGENCY MEDICINE

## 2020-10-20 PROCEDURE — 99223 1ST HOSP IP/OBS HIGH 75: CPT | Performed by: INTERNAL MEDICINE

## 2020-10-20 PROCEDURE — 99255 IP/OBS CONSLTJ NEW/EST HI 80: CPT | Performed by: INTERNAL MEDICINE

## 2020-10-20 PROCEDURE — 71045 X-RAY EXAM CHEST 1 VIEW: CPT

## 2020-10-20 PROCEDURE — 86850 RBC ANTIBODY SCREEN: CPT | Performed by: EMERGENCY MEDICINE

## 2020-10-20 PROCEDURE — 93005 ELECTROCARDIOGRAM TRACING: CPT

## 2020-10-20 PROCEDURE — 96365 THER/PROPH/DIAG IV INF INIT: CPT

## 2020-10-20 PROCEDURE — C9113 INJ PANTOPRAZOLE SODIUM, VIA: HCPCS | Performed by: EMERGENCY MEDICINE

## 2020-10-20 PROCEDURE — 86900 BLOOD TYPING SEROLOGIC ABO: CPT | Performed by: EMERGENCY MEDICINE

## 2020-10-20 PROCEDURE — 85025 COMPLETE CBC W/AUTO DIFF WBC: CPT | Performed by: EMERGENCY MEDICINE

## 2020-10-20 PROCEDURE — 85014 HEMATOCRIT: CPT | Performed by: INTERNAL MEDICINE

## 2020-10-20 PROCEDURE — 96375 TX/PRO/DX INJ NEW DRUG ADDON: CPT

## 2020-10-20 PROCEDURE — 80053 COMPREHEN METABOLIC PANEL: CPT | Performed by: EMERGENCY MEDICINE

## 2020-10-20 PROCEDURE — 80048 BASIC METABOLIC PNL TOTAL CA: CPT | Performed by: INTERNAL MEDICINE

## 2020-10-20 PROCEDURE — 99285 EMERGENCY DEPT VISIT HI MDM: CPT

## 2020-10-20 RX ORDER — LANOLIN ALCOHOL/MO/W.PET/CERES
6 CREAM (GRAM) TOPICAL
Status: DISCONTINUED | OUTPATIENT
Start: 2020-10-20 | End: 2020-10-22 | Stop reason: HOSPADM

## 2020-10-20 RX ORDER — ONDANSETRON 2 MG/ML
4 INJECTION INTRAMUSCULAR; INTRAVENOUS EVERY 4 HOURS PRN
Status: DISCONTINUED | OUTPATIENT
Start: 2020-10-20 | End: 2020-10-22 | Stop reason: HOSPADM

## 2020-10-20 RX ORDER — ONDANSETRON 2 MG/ML
4 INJECTION INTRAMUSCULAR; INTRAVENOUS ONCE
Status: COMPLETED | OUTPATIENT
Start: 2020-10-20 | End: 2020-10-20

## 2020-10-20 RX ORDER — SODIUM CHLORIDE AND POTASSIUM CHLORIDE .9; .15 G/100ML; G/100ML
100 SOLUTION INTRAVENOUS CONTINUOUS
Status: DISCONTINUED | OUTPATIENT
Start: 2020-10-20 | End: 2020-10-21

## 2020-10-20 RX ORDER — PANTOPRAZOLE SODIUM 40 MG/1
40 INJECTION, POWDER, FOR SOLUTION INTRAVENOUS EVERY 12 HOURS SCHEDULED
Status: DISCONTINUED | OUTPATIENT
Start: 2020-10-20 | End: 2020-10-20 | Stop reason: ALTCHOICE

## 2020-10-20 RX ORDER — PROMETHAZINE HYDROCHLORIDE 25 MG/ML
12.5 INJECTION, SOLUTION INTRAMUSCULAR; INTRAVENOUS ONCE
Status: COMPLETED | OUTPATIENT
Start: 2020-10-20 | End: 2020-10-20

## 2020-10-20 RX ORDER — SODIUM CHLORIDE 9 MG/ML
100 INJECTION, SOLUTION INTRAVENOUS CONTINUOUS
Status: DISCONTINUED | OUTPATIENT
Start: 2020-10-20 | End: 2020-10-20

## 2020-10-20 RX ORDER — METOCLOPRAMIDE HYDROCHLORIDE 5 MG/ML
10 INJECTION INTRAMUSCULAR; INTRAVENOUS ONCE
Status: COMPLETED | OUTPATIENT
Start: 2020-10-20 | End: 2020-10-20

## 2020-10-20 RX ADMIN — MELATONIN 6 MG: at 21:22

## 2020-10-20 RX ADMIN — ONDANSETRON 4 MG: 2 INJECTION INTRAMUSCULAR; INTRAVENOUS at 12:00

## 2020-10-20 RX ADMIN — METOCLOPRAMIDE 10 MG: 5 INJECTION, SOLUTION INTRAMUSCULAR; INTRAVENOUS at 18:54

## 2020-10-20 RX ADMIN — SODIUM CHLORIDE 80 MG: 9 INJECTION, SOLUTION INTRAVENOUS at 07:32

## 2020-10-20 RX ADMIN — PROMETHAZINE HYDROCHLORIDE 12.5 MG: 25 INJECTION INTRAMUSCULAR; INTRAVENOUS at 23:57

## 2020-10-20 RX ADMIN — FOLIC ACID: 5 INJECTION, SOLUTION INTRAMUSCULAR; INTRAVENOUS; SUBCUTANEOUS at 13:14

## 2020-10-20 RX ADMIN — ONDANSETRON 4 MG: 2 INJECTION INTRAMUSCULAR; INTRAVENOUS at 09:59

## 2020-10-20 RX ADMIN — SODIUM CHLORIDE AND POTASSIUM CHLORIDE 100 ML/HR: .9; .15 SOLUTION INTRAVENOUS at 23:20

## 2020-10-20 RX ADMIN — ONDANSETRON 4 MG: 2 INJECTION INTRAMUSCULAR; INTRAVENOUS at 15:55

## 2020-10-20 RX ADMIN — SODIUM CHLORIDE 500 ML: 0.9 INJECTION, SOLUTION INTRAVENOUS at 07:49

## 2020-10-20 RX ADMIN — GLYCERIN, HYPROMELLOSE, POLYETHYLENE GLYCOL 1 DROP: .2; .2; 1 LIQUID OPHTHALMIC at 18:56

## 2020-10-20 RX ADMIN — SODIUM CHLORIDE 100 ML/HR: 0.9 INJECTION, SOLUTION INTRAVENOUS at 09:56

## 2020-10-20 RX ADMIN — SODIUM CHLORIDE 1000 ML: 0.9 INJECTION, SOLUTION INTRAVENOUS at 07:13

## 2020-10-20 RX ADMIN — ONDANSETRON 4 MG: 2 INJECTION INTRAMUSCULAR; INTRAVENOUS at 07:14

## 2020-10-20 RX ADMIN — MORPHINE SULFATE 2 MG: 2 INJECTION, SOLUTION INTRAMUSCULAR; INTRAVENOUS at 10:02

## 2020-10-20 RX ADMIN — ONDANSETRON 4 MG: 2 INJECTION INTRAMUSCULAR; INTRAVENOUS at 21:16

## 2020-10-20 RX ADMIN — SODIUM CHLORIDE 500 ML: 0.9 INJECTION, SOLUTION INTRAVENOUS at 08:35

## 2020-10-20 RX ADMIN — SODIUM CHLORIDE 8 MG/HR: 9 INJECTION, SOLUTION INTRAVENOUS at 21:10

## 2020-10-20 RX ADMIN — SODIUM CHLORIDE AND POTASSIUM CHLORIDE 100 ML/HR: .9; .15 SOLUTION INTRAVENOUS at 13:14

## 2020-10-20 RX ADMIN — SODIUM CHLORIDE 8 MG/HR: 9 INJECTION, SOLUTION INTRAVENOUS at 10:34

## 2020-10-21 ENCOUNTER — ANESTHESIA (INPATIENT)
Dept: GASTROENTEROLOGY | Facility: HOSPITAL | Age: 65
DRG: 243 | End: 2020-10-21
Payer: COMMERCIAL

## 2020-10-21 ENCOUNTER — APPOINTMENT (INPATIENT)
Dept: GASTROENTEROLOGY | Facility: HOSPITAL | Age: 65
DRG: 243 | End: 2020-10-21
Payer: COMMERCIAL

## 2020-10-21 VITALS — HEART RATE: 97 BPM

## 2020-10-21 PROBLEM — E03.9 HYPOTHYROIDISM: Status: ACTIVE | Noted: 2020-10-21

## 2020-10-21 PROBLEM — N17.9 AKI (ACUTE KIDNEY INJURY) (HCC): Status: ACTIVE | Noted: 2020-10-20

## 2020-10-21 LAB
ALBUMIN SERPL BCP-MCNC: 2.7 G/DL (ref 3.5–5)
ALP SERPL-CCNC: 89 U/L (ref 46–116)
ALT SERPL W P-5'-P-CCNC: 31 U/L (ref 12–78)
ANION GAP SERPL CALCULATED.3IONS-SCNC: 8 MMOL/L (ref 4–13)
AST SERPL W P-5'-P-CCNC: 43 U/L (ref 5–45)
BASOPHILS # BLD AUTO: 0.02 THOUSANDS/ΜL (ref 0–0.1)
BASOPHILS NFR BLD AUTO: 0 % (ref 0–1)
BILIRUB SERPL-MCNC: 0.46 MG/DL (ref 0.2–1)
BUN SERPL-MCNC: 15 MG/DL (ref 5–25)
CALCIUM ALBUM COR SERPL-MCNC: 8.4 MG/DL (ref 8.3–10.1)
CALCIUM SERPL-MCNC: 7.4 MG/DL (ref 8.3–10.1)
CHLORIDE SERPL-SCNC: 108 MMOL/L (ref 100–108)
CO2 SERPL-SCNC: 26 MMOL/L (ref 21–32)
CREAT SERPL-MCNC: 1.53 MG/DL (ref 0.6–1.3)
EOSINOPHIL # BLD AUTO: 0 THOUSAND/ΜL (ref 0–0.61)
EOSINOPHIL NFR BLD AUTO: 0 % (ref 0–6)
ERYTHROCYTE [DISTWIDTH] IN BLOOD BY AUTOMATED COUNT: 13.7 % (ref 11.6–15.1)
GFR SERPL CREATININE-BSD FRML MDRD: 36 ML/MIN/1.73SQ M
GLUCOSE SERPL-MCNC: 106 MG/DL (ref 65–140)
HCT VFR BLD AUTO: 35.6 % (ref 34.8–46.1)
HGB BLD-MCNC: 11.1 G/DL (ref 11.5–15.4)
HGB BLD-MCNC: 11.2 G/DL (ref 11.5–15.4)
HGB BLD-MCNC: 11.4 G/DL (ref 11.5–15.4)
IMM GRANULOCYTES # BLD AUTO: 0.03 THOUSAND/UL (ref 0–0.2)
IMM GRANULOCYTES NFR BLD AUTO: 0 % (ref 0–2)
INR PPP: 1.18 (ref 0.84–1.19)
LYMPHOCYTES # BLD AUTO: 1.37 THOUSANDS/ΜL (ref 0.6–4.47)
LYMPHOCYTES NFR BLD AUTO: 17 % (ref 14–44)
MAGNESIUM SERPL-MCNC: 2.1 MG/DL (ref 1.6–2.6)
MCH RBC QN AUTO: 30 PG (ref 26.8–34.3)
MCHC RBC AUTO-ENTMCNC: 32 G/DL (ref 31.4–37.4)
MCV RBC AUTO: 94 FL (ref 82–98)
MONOCYTES # BLD AUTO: 0.57 THOUSAND/ΜL (ref 0.17–1.22)
MONOCYTES NFR BLD AUTO: 7 % (ref 4–12)
NEUTROPHILS # BLD AUTO: 6.01 THOUSANDS/ΜL (ref 1.85–7.62)
NEUTS SEG NFR BLD AUTO: 76 % (ref 43–75)
NRBC BLD AUTO-RTO: 0 /100 WBCS
PHOSPHATE SERPL-MCNC: 1.3 MG/DL (ref 2.3–4.1)
PLATELET # BLD AUTO: 158 THOUSANDS/UL (ref 149–390)
PMV BLD AUTO: 11.3 FL (ref 8.9–12.7)
POTASSIUM SERPL-SCNC: 3.1 MMOL/L (ref 3.5–5.3)
PROT SERPL-MCNC: 6.1 G/DL (ref 6.4–8.2)
PROTHROMBIN TIME: 14.8 SECONDS (ref 11.6–14.5)
RBC # BLD AUTO: 3.8 MILLION/UL (ref 3.81–5.12)
SODIUM SERPL-SCNC: 142 MMOL/L (ref 136–145)
WBC # BLD AUTO: 8 THOUSAND/UL (ref 4.31–10.16)

## 2020-10-21 PROCEDURE — 0DJ08ZZ INSPECTION OF UPPER INTESTINAL TRACT, VIA NATURAL OR ARTIFICIAL OPENING ENDOSCOPIC: ICD-10-PCS | Performed by: INTERNAL MEDICINE

## 2020-10-21 PROCEDURE — 80053 COMPREHEN METABOLIC PANEL: CPT | Performed by: INTERNAL MEDICINE

## 2020-10-21 PROCEDURE — 84100 ASSAY OF PHOSPHORUS: CPT | Performed by: INTERNAL MEDICINE

## 2020-10-21 PROCEDURE — 43235 EGD DIAGNOSTIC BRUSH WASH: CPT | Performed by: INTERNAL MEDICINE

## 2020-10-21 PROCEDURE — 85018 HEMOGLOBIN: CPT | Performed by: INTERNAL MEDICINE

## 2020-10-21 PROCEDURE — 85610 PROTHROMBIN TIME: CPT | Performed by: INTERNAL MEDICINE

## 2020-10-21 PROCEDURE — 83735 ASSAY OF MAGNESIUM: CPT | Performed by: INTERNAL MEDICINE

## 2020-10-21 PROCEDURE — 99232 SBSQ HOSP IP/OBS MODERATE 35: CPT | Performed by: INTERNAL MEDICINE

## 2020-10-21 PROCEDURE — 85025 COMPLETE CBC W/AUTO DIFF WBC: CPT | Performed by: INTERNAL MEDICINE

## 2020-10-21 PROCEDURE — C9113 INJ PANTOPRAZOLE SODIUM, VIA: HCPCS | Performed by: INTERNAL MEDICINE

## 2020-10-21 PROCEDURE — 99233 SBSQ HOSP IP/OBS HIGH 50: CPT | Performed by: INTERNAL MEDICINE

## 2020-10-21 RX ORDER — SODIUM CHLORIDE 9 MG/ML
75 INJECTION, SOLUTION INTRAVENOUS CONTINUOUS
Status: DISCONTINUED | OUTPATIENT
Start: 2020-10-21 | End: 2020-10-21

## 2020-10-21 RX ORDER — POTASSIUM CHLORIDE AND SODIUM CHLORIDE 900; 300 MG/100ML; MG/100ML
75 INJECTION, SOLUTION INTRAVENOUS CONTINUOUS
Status: DISCONTINUED | OUTPATIENT
Start: 2020-10-21 | End: 2020-10-22 | Stop reason: HOSPADM

## 2020-10-21 RX ORDER — SODIUM CHLORIDE 9 MG/ML
125 INJECTION, SOLUTION INTRAVENOUS CONTINUOUS
Status: DISCONTINUED | OUTPATIENT
Start: 2020-10-21 | End: 2020-10-21

## 2020-10-21 RX ORDER — PROPOFOL 10 MG/ML
INJECTION, EMULSION INTRAVENOUS AS NEEDED
Status: DISCONTINUED | OUTPATIENT
Start: 2020-10-21 | End: 2020-10-21

## 2020-10-21 RX ORDER — LEVOTHYROXINE SODIUM 88 UG/1
88 TABLET ORAL DAILY
Status: DISCONTINUED | OUTPATIENT
Start: 2020-10-22 | End: 2020-10-22 | Stop reason: HOSPADM

## 2020-10-21 RX ORDER — LEVOTHYROXINE SODIUM 88 UG/1
88 TABLET ORAL DAILY
COMMUNITY

## 2020-10-21 RX ADMIN — POTASSIUM CHLORIDE AND SODIUM CHLORIDE 75 ML/HR: 900; 300 INJECTION, SOLUTION INTRAVENOUS at 19:34

## 2020-10-21 RX ADMIN — ONDANSETRON 4 MG: 2 INJECTION INTRAMUSCULAR; INTRAVENOUS at 14:16

## 2020-10-21 RX ADMIN — MELATONIN 6 MG: at 22:12

## 2020-10-21 RX ADMIN — SODIUM CHLORIDE 8 MG/HR: 9 INJECTION, SOLUTION INTRAVENOUS at 06:35

## 2020-10-21 RX ADMIN — SODIUM CHLORIDE: 0.9 INJECTION, SOLUTION INTRAVENOUS at 15:53

## 2020-10-21 RX ADMIN — PROPOFOL 100 MG: 10 INJECTION, EMULSION INTRAVENOUS at 15:55

## 2020-10-21 RX ADMIN — FOLIC ACID: 5 INJECTION, SOLUTION INTRAMUSCULAR; INTRAVENOUS; SUBCUTANEOUS at 08:45

## 2020-10-21 RX ADMIN — ONDANSETRON 4 MG: 2 INJECTION INTRAMUSCULAR; INTRAVENOUS at 04:00

## 2020-10-21 RX ADMIN — SODIUM CHLORIDE AND POTASSIUM CHLORIDE 100 ML/HR: .9; .15 SOLUTION INTRAVENOUS at 09:54

## 2020-10-21 RX ADMIN — SODIUM CHLORIDE 8 MG/HR: 9 INJECTION, SOLUTION INTRAVENOUS at 18:12

## 2020-10-21 RX ADMIN — ONDANSETRON 4 MG: 2 INJECTION INTRAMUSCULAR; INTRAVENOUS at 08:45

## 2020-10-22 ENCOUNTER — TELEPHONE (OUTPATIENT)
Dept: GASTROENTEROLOGY | Facility: MEDICAL CENTER | Age: 65
End: 2020-10-22

## 2020-10-22 VITALS
BODY MASS INDEX: 35.88 KG/M2 | HEART RATE: 91 BPM | RESPIRATION RATE: 16 BRPM | TEMPERATURE: 98 F | SYSTOLIC BLOOD PRESSURE: 135 MMHG | DIASTOLIC BLOOD PRESSURE: 64 MMHG | HEIGHT: 62 IN | WEIGHT: 195 LBS | OXYGEN SATURATION: 95 %

## 2020-10-22 LAB
ALBUMIN SERPL BCP-MCNC: 2.5 G/DL (ref 3.5–5)
ALP SERPL-CCNC: 80 U/L (ref 46–116)
ALT SERPL W P-5'-P-CCNC: 41 U/L (ref 12–78)
ANION GAP SERPL CALCULATED.3IONS-SCNC: 6 MMOL/L (ref 4–13)
AST SERPL W P-5'-P-CCNC: 57 U/L (ref 5–45)
BILIRUB SERPL-MCNC: 0.41 MG/DL (ref 0.2–1)
BUN SERPL-MCNC: 10 MG/DL (ref 5–25)
CALCIUM ALBUM COR SERPL-MCNC: 8.9 MG/DL (ref 8.3–10.1)
CALCIUM SERPL-MCNC: 7.7 MG/DL (ref 8.3–10.1)
CHLORIDE SERPL-SCNC: 108 MMOL/L (ref 100–108)
CO2 SERPL-SCNC: 26 MMOL/L (ref 21–32)
CREAT SERPL-MCNC: 0.96 MG/DL (ref 0.6–1.3)
ERYTHROCYTE [DISTWIDTH] IN BLOOD BY AUTOMATED COUNT: 13.2 % (ref 11.6–15.1)
GFR SERPL CREATININE-BSD FRML MDRD: 63 ML/MIN/1.73SQ M
GLUCOSE SERPL-MCNC: 99 MG/DL (ref 65–140)
HCT VFR BLD AUTO: 33.7 % (ref 34.8–46.1)
HGB BLD-MCNC: 10.6 G/DL (ref 11.5–15.4)
MCH RBC QN AUTO: 29.1 PG (ref 26.8–34.3)
MCHC RBC AUTO-ENTMCNC: 31.5 G/DL (ref 31.4–37.4)
MCV RBC AUTO: 93 FL (ref 82–98)
PLATELET # BLD AUTO: 111 THOUSANDS/UL (ref 149–390)
PMV BLD AUTO: 10.5 FL (ref 8.9–12.7)
POTASSIUM SERPL-SCNC: 3.6 MMOL/L (ref 3.5–5.3)
PROT SERPL-MCNC: 5.7 G/DL (ref 6.4–8.2)
RBC # BLD AUTO: 3.64 MILLION/UL (ref 3.81–5.12)
SODIUM SERPL-SCNC: 140 MMOL/L (ref 136–145)
WBC # BLD AUTO: 4.69 THOUSAND/UL (ref 4.31–10.16)

## 2020-10-22 PROCEDURE — 99239 HOSP IP/OBS DSCHRG MGMT >30: CPT | Performed by: INTERNAL MEDICINE

## 2020-10-22 PROCEDURE — 99232 SBSQ HOSP IP/OBS MODERATE 35: CPT | Performed by: NURSE PRACTITIONER

## 2020-10-22 PROCEDURE — 85027 COMPLETE CBC AUTOMATED: CPT | Performed by: INTERNAL MEDICINE

## 2020-10-22 PROCEDURE — 80053 COMPREHEN METABOLIC PANEL: CPT | Performed by: INTERNAL MEDICINE

## 2020-10-22 PROCEDURE — C9113 INJ PANTOPRAZOLE SODIUM, VIA: HCPCS | Performed by: INTERNAL MEDICINE

## 2020-10-22 RX ORDER — SUCRALFATE ORAL 1 G/10ML
1 SUSPENSION ORAL 4 TIMES DAILY
Qty: 840 ML | Refills: 0 | Status: SHIPPED | OUTPATIENT
Start: 2020-10-22

## 2020-10-22 RX ORDER — PANTOPRAZOLE SODIUM 40 MG/1
40 TABLET, DELAYED RELEASE ORAL
Qty: 30 TABLET | Refills: 2 | Status: SHIPPED | OUTPATIENT
Start: 2020-10-22

## 2020-10-22 RX ADMIN — LEVOTHYROXINE SODIUM 88 MCG: 88 TABLET ORAL at 05:33

## 2020-10-22 RX ADMIN — FOLIC ACID: 5 INJECTION, SOLUTION INTRAMUSCULAR; INTRAVENOUS; SUBCUTANEOUS at 08:35

## 2020-10-22 RX ADMIN — SODIUM CHLORIDE 8 MG/HR: 9 INJECTION, SOLUTION INTRAVENOUS at 06:04
